# Patient Record
Sex: MALE | Race: BLACK OR AFRICAN AMERICAN
[De-identification: names, ages, dates, MRNs, and addresses within clinical notes are randomized per-mention and may not be internally consistent; named-entity substitution may affect disease eponyms.]

---

## 2017-05-09 ENCOUNTER — HOSPITAL ENCOUNTER (INPATIENT)
Dept: HOSPITAL 25 - ED | Age: 57
LOS: 1 days | Discharge: ANOTHER HOSPITAL | DRG: 281 | End: 2017-05-10
Attending: INTERNAL MEDICINE | Admitting: INTERNAL MEDICINE
Payer: COMMERCIAL

## 2017-05-09 DIAGNOSIS — I25.110: ICD-10-CM

## 2017-05-09 DIAGNOSIS — I16.0: ICD-10-CM

## 2017-05-09 DIAGNOSIS — E11.9: ICD-10-CM

## 2017-05-09 DIAGNOSIS — I10: ICD-10-CM

## 2017-05-09 DIAGNOSIS — Z82.49: ICD-10-CM

## 2017-05-09 DIAGNOSIS — Z88.8: ICD-10-CM

## 2017-05-09 DIAGNOSIS — I25.10: ICD-10-CM

## 2017-05-09 DIAGNOSIS — G47.33: ICD-10-CM

## 2017-05-09 DIAGNOSIS — I21.4: Primary | ICD-10-CM

## 2017-05-09 DIAGNOSIS — Z87.891: ICD-10-CM

## 2017-05-09 DIAGNOSIS — E66.01: ICD-10-CM

## 2017-05-09 DIAGNOSIS — I77.1: ICD-10-CM

## 2017-05-09 DIAGNOSIS — Z83.3: ICD-10-CM

## 2017-05-09 DIAGNOSIS — I77.810: ICD-10-CM

## 2017-05-09 DIAGNOSIS — I08.3: ICD-10-CM

## 2017-05-09 LAB
ALBUMIN SERPL BCG-MCNC: 4.1 G/DL (ref 3.2–5.2)
ALP SERPL-CCNC: 95 U/L (ref 34–104)
ALT SERPL W P-5'-P-CCNC: 12 U/L (ref 7–52)
ANION GAP SERPL CALC-SCNC: 7 MMOL/L (ref 2–11)
AST SERPL-CCNC: 18 U/L (ref 13–39)
BUN SERPL-MCNC: 10 MG/DL (ref 6–24)
BUN/CREAT SERPL: 10.4 (ref 8–20)
CALCIUM SERPL-MCNC: 9.6 MG/DL (ref 8.6–10.3)
CHLORIDE SERPL-SCNC: 100 MMOL/L (ref 101–111)
CK SERPL-CCNC: 98 U/L (ref 10–223)
GLOBULIN SER CALC-MCNC: 4.2 G/DL (ref 2–4)
GLUCOSE SERPL-MCNC: 95 MG/DL (ref 70–100)
HCO3 SERPL-SCNC: 26 MMOL/L (ref 22–32)
HCT VFR BLD AUTO: 40 % (ref 42–52)
HCT VFR BLD AUTO: 42 % (ref 42–52)
HGB BLD-MCNC: 12.9 G/DL (ref 14–18)
HGB BLD-MCNC: 13.4 G/DL (ref 14–18)
LIPASE SERPL-CCNC: 13 U/L (ref 11–82)
MAGNESIUM SERPL-MCNC: 2 MG/DL (ref 1.9–2.7)
MCH RBC QN AUTO: 26 PG (ref 27–31)
MCH RBC QN AUTO: 26 PG (ref 27–31)
MCHC RBC AUTO-ENTMCNC: 32 G/DL (ref 31–36)
MCHC RBC AUTO-ENTMCNC: 32 G/DL (ref 31–36)
MCV RBC AUTO: 82 FL (ref 80–94)
MCV RBC AUTO: 83 FL (ref 80–94)
POTASSIUM SERPL-SCNC: 4.3 MMOL/L (ref 3.5–5)
PROT SERPL-MCNC: 8.3 G/DL (ref 6.4–8.9)
RBC # BLD AUTO: 4.94 10^6/UL (ref 4–5.4)
RBC # BLD AUTO: 5.1 10^6/UL (ref 4–5.4)
SODIUM SERPL-SCNC: 133 MMOL/L (ref 133–145)
TROPONIN I SERPL-MCNC: 0.47 NG/ML (ref ?–0.04)
TSH SERPL-ACNC: 1.26 MCIU/ML (ref 0.34–5.6)
WBC # BLD AUTO: 6.1 10^3/UL (ref 3.5–10.8)
WBC # BLD AUTO: 8.2 10^3/UL (ref 3.5–10.8)

## 2017-05-09 PROCEDURE — 94760 N-INVAS EAR/PLS OXIMETRY 1: CPT

## 2017-05-09 PROCEDURE — 36415 COLL VENOUS BLD VENIPUNCTURE: CPT

## 2017-05-09 PROCEDURE — 84484 ASSAY OF TROPONIN QUANT: CPT

## 2017-05-09 PROCEDURE — 93306 TTE W/DOPPLER COMPLETE: CPT

## 2017-05-09 PROCEDURE — 83735 ASSAY OF MAGNESIUM: CPT

## 2017-05-09 PROCEDURE — C1760 CLOSURE DEV, VASC: HCPCS

## 2017-05-09 PROCEDURE — 80061 LIPID PANEL: CPT

## 2017-05-09 PROCEDURE — 83036 HEMOGLOBIN GLYCOSYLATED A1C: CPT

## 2017-05-09 PROCEDURE — 83880 ASSAY OF NATRIURETIC PEPTIDE: CPT

## 2017-05-09 PROCEDURE — 82550 ASSAY OF CK (CPK): CPT

## 2017-05-09 PROCEDURE — 87641 MR-STAPH DNA AMP PROBE: CPT

## 2017-05-09 PROCEDURE — 85730 THROMBOPLASTIN TIME PARTIAL: CPT

## 2017-05-09 PROCEDURE — 85379 FIBRIN DEGRADATION QUANT: CPT

## 2017-05-09 PROCEDURE — 80048 BASIC METABOLIC PNL TOTAL CA: CPT

## 2017-05-09 PROCEDURE — 83605 ASSAY OF LACTIC ACID: CPT

## 2017-05-09 PROCEDURE — 93005 ELECTROCARDIOGRAM TRACING: CPT

## 2017-05-09 PROCEDURE — 93458 L HRT ARTERY/VENTRICLE ANGIO: CPT

## 2017-05-09 PROCEDURE — 85610 PROTHROMBIN TIME: CPT

## 2017-05-09 PROCEDURE — 86140 C-REACTIVE PROTEIN: CPT

## 2017-05-09 PROCEDURE — 71010: CPT

## 2017-05-09 PROCEDURE — 83690 ASSAY OF LIPASE: CPT

## 2017-05-09 PROCEDURE — 82553 CREATINE MB FRACTION: CPT

## 2017-05-09 PROCEDURE — 84443 ASSAY THYROID STIM HORMONE: CPT

## 2017-05-09 PROCEDURE — 80053 COMPREHEN METABOLIC PANEL: CPT

## 2017-05-09 PROCEDURE — 85025 COMPLETE CBC W/AUTO DIFF WBC: CPT

## 2017-05-09 PROCEDURE — 76937 US GUIDE VASCULAR ACCESS: CPT

## 2017-05-09 RX ADMIN — CAPTOPRIL SCH MG: 12.5 TABLET ORAL at 20:16

## 2017-05-09 RX ADMIN — NITROGLYCERIN SCH INCH: 20 OINTMENT TOPICAL at 20:18

## 2017-05-09 RX ADMIN — ACETAMINOPHEN PRN MG: 325 TABLET ORAL at 20:16

## 2017-05-09 RX ADMIN — ATORVASTATIN CALCIUM ONE MG: 80 TABLET, FILM COATED ORAL at 12:44

## 2017-05-09 RX ADMIN — HEPARIN SODIUM AND DEXTROSE SCH MLS/HR: 5000; 5 INJECTION INTRAVENOUS at 12:32

## 2017-05-09 RX ADMIN — METOPROLOL TARTRATE SCH MG: 25 TABLET, FILM COATED ORAL at 20:16

## 2017-05-09 RX ADMIN — ATORVASTATIN CALCIUM ONE: 80 TABLET, FILM COATED ORAL at 12:44

## 2017-05-09 RX ADMIN — NITROGLYCERIN SCH INCH: 20 OINTMENT TOPICAL at 11:56

## 2017-05-09 NOTE — ED
Shilpa MADDOX Auryana, scribed for Rosendo Nolen MD on 05/09/17 at 1132 .





HPI Chest Pain





- HPI Summary


HPI Summary: 


 57 year old male presents with chest pain starting this morning while walking 

for 3-5 minutes. He reports that the pain was located in the mid sternum and 

radiated into the left arm with tightness. He reports that it initially started 

as a burning pain. With exertion, the pain is an 8/10, and is resolved with 

rest. He reports that he still has tightness in the shoulders and arms (mostly L

) but denies any chest pain now. He denies any edema or calf pain. He reports 

that he has had one similar episode with activity about 6 weeks ago - states 

pain radiated to the back , shoulders, and bilateral arms- subsided with rest. 

No prior stress tests.  PMHx is significant for HTN (no medication). No PMHx of 

abdominal surgeries.








- History of Current Complaint


Chief Complaint: EDChestPainROMI


Time Seen by Provider: 05/09/17 11:25


Hx Obtained From: Patient


Onset/Duration: Started Hours Ago - this morning while walking, Resolved - 

resolved with rest


Timing: Lasting Minutes - 3-5 minutes


Initial Severity: Mild


Current Severity: Mild


Pain Intensity: 2 - reports no CP on ED physician visit 


Pain Scale Used: 0-10 Numeric


Chest Pain Location: Diffuse, Mid Sternal


Chest Pain Radiates: Yes


Chest Pain Radiates To:: Shoulder - tightness, Arm - tightness


Character: Pressure/Squeezing, Tightness


Aggravating Factor(s): Exertion - brings on pain


Alleviating Factor(s): Rest


Associated Signs and Symptoms: Positive: Chest Pain - denies any now, Other: - 

tightness in arms and shoulder


Related History: Similar Episode/Dx as: - YES - SEE HPI





- Allergy/Home Medications


Allergies/Adverse Reactions: 


 Allergies











Allergy/AdvReac Type Severity Reaction Status Date / Time


 


Atenolol [From Tenormin] Allergy  Bleeding Verified 05/09/17 11:15














PMH/Surg Hx/FS Hx/Imm Hx


Cardiovascular History: Reports: Hx Hypertension - untreated


GI History: Reports: Hx Gastrointestinal Bleed


Infectious Disease History: No


Infectious Disease History: 


   Denies: Traveled Outside the [place] in Last 30 Days





- Family History


Known Family History: Positive: Hypertension, Diabetes





- Social History


Occupation: Employed Full-time


Lives: With Family - SIGNIFICANT OTHER


Alcohol Use: history of past ETOH abuse per history & physical in 2006


Hx Substance Use: No


Substance Use Type: Reports: None


Hx Tobacco Use: No


Smoking Status (MU): Never Smoked Tobacco





Review of Systems


Constitutional: Negative


Negative: Fever


Eyes: Negative


ENT: Negative


Cardiovascular: Negative


Negative: Chest Pain - none now


Respiratory: Negative


Gastrointestinal: Negative


Genitourinary: Negative


Positive: Other - tightness in the shoulers and bilateral arms (L>R).  Negative

: Edema


Skin: Negative


Neurological: Negative


Psychological: Normal


All Other Systems Reviewed And Are Negative: Yes





Physical Exam


Triage Information Reviewed: Yes


Vital Signs On Initial Exam: 


 Initial Vitals











Temp Pulse Resp BP Pulse Ox


 


 97.8 F   114   20   180/98   100 


 


 05/09/17 10:30  05/09/17 10:30  05/09/17 10:30  05/09/17 10:30  05/09/17 10:30











Vital Signs Reviewed: Yes


Appearance: Positive: Well-Appearing, No Pain Distress


Skin: Positive: Warm, Skin Color Reflects Adequate Perfusion, Dry


Head/Face: Positive: Normal Head/Face Inspection


Eyes: Positive: EOMI, FLACA


ENT: Positive: Normal ENT inspection


Neck: Positive: Supple, Nontender


Respiratory/Lung Sounds: Positive: Clear to Auscultation, Breath Sounds Present


Cardiovascular: Positive: RRR.  Negative: Leg Edema Left, Leg Edema Right


Abdomen Description: Positive: Nontender, Soft


Bowel Sounds: Positive: Present


Musculoskeletal: Positive: Normal, Strength/ROM Intact.  Negative: Edema Left, 

Edema Right


Neurological: Positive: Normal, Sensory/Motor Intact, Alert, Oriented to Person 

Place, Time


Psychiatric: Positive: Normal, Affect/Mood Appropriate





Diagnostics





- Vital Signs


 Vital Signs











  Temp Pulse Resp BP Pulse Ox


 


 05/09/17 10:54   94  10   98


 


 05/09/17 10:51  98.4 F  103  19  227/143  100


 


 05/09/17 10:30  97.8 F  114  20  180/98  100














- Laboratory


Lab Results: 


 Lab Results











  05/09/17 05/09/17 05/09/17 Range/Units





  11:05 11:05 11:05 


 


WBC  6.1    (3.5-10.8)  10^3/ul


 


RBC  5.10    (4.0-5.4)  10^6/ul


 


Hgb  13.4 L    (14.0-18.0)  g/dl


 


Hct  42    (42-52)  %


 


MCV  83    (80-94)  fL


 


MCH  26 L    (27-31)  pg


 


MCHC  32    (31-36)  g/dl


 


RDW  14    (10.5-15)  %


 


Plt Count  265    (150-450)  10^3/ul


 


MPV  8    (7.4-10.4)  um3


 


Neut % (Auto)  50.3    (38-83)  %


 


Lymph % (Auto)  38.6    (25-47)  %


 


Mono % (Auto)  9.4 H    (1-9)  %


 


Eos % (Auto)  0.6    (0-6)  %


 


Baso % (Auto)  1.1    (0-2)  %


 


Absolute Neuts (auto)  3.0    (1.5-7.7)  10^3/ul


 


Absolute Lymphs (auto)  2.3    (1.0-4.8)  10^3/ul


 


Absolute Monos (auto)  0.6    (0-0.8)  10^3/ul


 


Absolute Eos (auto)  0    (0-0.6)  10^3/ul


 


Absolute Basos (auto)  0.1    (0-0.2)  10^3/ul


 


Absolute Nucleated RBC  0.01    10^3/ul


 


Nucleated RBC %  0.2    


 


INR (Anticoag Therapy)   1.02   (0.89-1.11)  


 


APTT   30.1   (26.0-36.3)  seconds


 


D-Dimer, Quantitative   < 200   (Less Than 230)  ng/mL


 


Sodium    133  (133-145)  mmol/L


 


Potassium    4.3  (3.5-5.0)  mmol/L


 


Chloride    100 L  (101-111)  mmol/L


 


Carbon Dioxide    26  (22-32)  mmol/L


 


Anion Gap    7  (2-11)  mmol/L


 


BUN    10  (6-24)  mg/dL


 


Creatinine    0.96  (0.67-1.17)  mg/dL


 


Est GFR ( Amer)    103.1  (>60)  


 


Est GFR (Non-Af Amer)    80.2  (>60)  


 


BUN/Creatinine Ratio    10.4  (8-20)  


 


Glucose    95  ()  mg/dL


 


Lactic Acid     (0.5-2.0)  mmol/L


 


Calcium    9.6  (8.6-10.3)  mg/dL


 


Magnesium    2.0  (1.9-2.7)  mg/dL


 


Total Bilirubin    0.50  (0.2-1.0)  mg/dL


 


AST    18  (13-39)  U/L


 


ALT    12  (7-52)  U/L


 


Alkaline Phosphatase    95  ()  U/L


 


Total Creatine Kinase    98  ()  U/L


 


CK-MB (CK-2)    3.1  (0.6-6.3)  ng/mL


 


Troponin I    0.47 H*  (<0.04)  ng/mL


 


C-Reactive Protein    20.36 H  (< 5.00)  mg/L


 


B-Natriuretic Peptide    ( - 100) pg/mL


 


Total Protein    8.3  (6.4-8.9)  g/dL


 


Albumin    4.1  (3.2-5.2)  g/dL


 


Globulin    4.2 H  (2-4)  g/dL


 


Albumin/Globulin Ratio    1.0  (1-3)  


 


Lipase    13  (11.0-82.0)  U/L


 


TSH    1.26  (0.34-5.60)  mcIU/mL














  05/09/17 05/09/17 Range/Units





  11:05 11:05 


 


WBC    (3.5-10.8)  10^3/ul


 


RBC    (4.0-5.4)  10^6/ul


 


Hgb    (14.0-18.0)  g/dl


 


Hct    (42-52)  %


 


MCV    (80-94)  fL


 


MCH    (27-31)  pg


 


MCHC    (31-36)  g/dl


 


RDW    (10.5-15)  %


 


Plt Count    (150-450)  10^3/ul


 


MPV    (7.4-10.4)  um3


 


Neut % (Auto)    (38-83)  %


 


Lymph % (Auto)    (25-47)  %


 


Mono % (Auto)    (1-9)  %


 


Eos % (Auto)    (0-6)  %


 


Baso % (Auto)    (0-2)  %


 


Absolute Neuts (auto)    (1.5-7.7)  10^3/ul


 


Absolute Lymphs (auto)    (1.0-4.8)  10^3/ul


 


Absolute Monos (auto)    (0-0.8)  10^3/ul


 


Absolute Eos (auto)    (0-0.6)  10^3/ul


 


Absolute Basos (auto)    (0-0.2)  10^3/ul


 


Absolute Nucleated RBC    10^3/ul


 


Nucleated RBC %    


 


INR (Anticoag Therapy)    (0.89-1.11)  


 


APTT    (26.0-36.3)  seconds


 


D-Dimer, Quantitative    (Less Than 230)  ng/mL


 


Sodium    (133-145)  mmol/L


 


Potassium    (3.5-5.0)  mmol/L


 


Chloride    (101-111)  mmol/L


 


Carbon Dioxide    (22-32)  mmol/L


 


Anion Gap    (2-11)  mmol/L


 


BUN    (6-24)  mg/dL


 


Creatinine    (0.67-1.17)  mg/dL


 


Est GFR ( Amer)    (>60)  


 


Est GFR (Non-Af Amer)    (>60)  


 


BUN/Creatinine Ratio    (8-20)  


 


Glucose    ()  mg/dL


 


Lactic Acid  1.6   (0.5-2.0)  mmol/L


 


Calcium    (8.6-10.3)  mg/dL


 


Magnesium    (1.9-2.7)  mg/dL


 


Total Bilirubin    (0.2-1.0)  mg/dL


 


AST    (13-39)  U/L


 


ALT    (7-52)  U/L


 


Alkaline Phosphatase    ()  U/L


 


Total Creatine Kinase    ()  U/L


 


CK-MB (CK-2)    (0.6-6.3)  ng/mL


 


Troponin I    (<0.04)  ng/mL


 


C-Reactive Protein    (< 5.00)  mg/L


 


B-Natriuretic Peptide   54 ( - 100) pg/mL


 


Total Protein    (6.4-8.9)  g/dL


 


Albumin    (3.2-5.2)  g/dL


 


Globulin    (2-4)  g/dL


 


Albumin/Globulin Ratio    (1-3)  


 


Lipase    (11.0-82.0)  U/L


 


TSH    (0.34-5.60)  mcIU/mL











Result Diagrams: 


 05/09/17 11:05





 05/09/17 11:05


Lab Statement: Any lab studies that have been ordered have been reviewed, and 

results considered in the medical decision making process.





- Radiology


  ** CXR


Xray Interpretation: No Acute Changes


Radiology Interpretation Completed By: Radiologist





- EKG


  ** 10:44


EKG Interpretation: NSR@96bpm,flipped T (V4,V5,V6);flat T(inferior lead);Q 

waves (III & aVf)





  ** 10:45


EKG Comparison: No Significant Change - from 10:44 EKG





Chest Pain Course/Dx





- Course


Course Of Treatment: CRITICAL CARE TIME LESS THAN 30 MINUTES


Assessment/Plan: DISCUSSED RESULTS WITH PATIENT/WIFE/HOSPITALIST. ADMIT 

HOSPITALIST STABLE. CARDIOLOGY SAW PATIENT IN ED.





- Diagnoses


Provider Diagnoses: 


 Chest pain, Elevated troponin, Hypertension








- Provider Notifications


Discussed Care Of Patient With: Dr. Shaw


Time Discussed With Above Provider: 11:32 - agrees to admit





Discharge





- Discharge Plan


Condition: Stable


Disposition: ADMITTED TO [facility] documentation as recorded by the Shilpa logan Auryana accurately 

reflects the service I personally performed and the decisions made by me, 

Rosendo Nolen MD.

## 2017-05-09 NOTE — CONS
CARDIOLOGY CONSULTATION:

 

DATE OF CONSULT:  05/09/17

 

INDICATION FOR CONSULTATION:  Chest pain, acute coronary syndrome.

 

HISTORY OF PRESENT ILLNESS:  The patient is a 57-year-old gentleman who did not 
seek medical care in the last 10 years who was admitted to the emergency room 
with chest pain.  The patient states that he had a significant episode of chest 
pain 6 weeks ago.  He described it as a crushing chest pain, radiating to both 
arms that lasted for approximately 3 hours and then resolved on its own.  Since 
then, the patient has been taking an aspirin a day and some other home 
remedies.  Since for the past week or so, he has been getting chest pain with 
exertion.  However, yesterday, he had chest pain with minimal exertion just 
from walking around his apartment.  The patient came into the emergency room 
this morning at the behest of his wife for his chest pain.  On arrival to the 
emergency room, the patient was markedly hypertensive with a blood pressure of 
280/110.  He was given sublingual nitroglycerin and IV Lopressor to lower his 
blood pressure.  The patient's initial troponin level was 0.47.  His initial 
EKG showed normal sinus rhythm with T-wave inversions in V4 through V6.

 

PAST MEDICAL HISTORY:  Unremarkable.  He did have a lower GI bleed back in 2006 
and had a colonoscopy at that time and showed diverticular disease.

 

OUTPATIENT MEDICATIONS:  None.

 

ALLERGIES:  None.

 

SOCIAL HISTORY:  He works as a general maintenance.  He denies tobacco or 
alcohol use.

 

REVIEW OF SYSTEMS:  Negative for fevers and chills.  Negative for changes in 
bowel or bladder habits.  Negative for changes in weight.  Again, he has not 
sought medical care in over 10 years.

 

PHYSICAL EXAMINATION:  Height is 6 feet, weight 280 pounds.  Blood pressure 166/
108, heart rate is 96, respiratory rate is 21, oxygen saturation 95% on room air
, temperature 97.4.  Sclerae anicteric.  Oropharynx is pink without erythema. 
Carotids are 2+ without bruits.  JVD is normal.  Thyroid is normal.  Cardiac 
Exam: Distant heart sounds.  S1, S2 without any obvious murmurs, rubs or 
gallops.  PMI is difficult to assess.  Lungs:  Clear to auscultation.  There is 
no dullness to percussion.  Abdomen:  Obese, soft, nontender, nondistended with 
normoactive bowel sounds.  Extremities:  Showed no edema.  He has 2+ pulses in 
dorsalis pedis and popliteal.  The patient is awake, alert and oriented.  He 
moves all 4 extremities equally.

 

DIAGNOSTIC STUDIES/LAB DATA:  CBC within normal limits.  Chemistries within 
normal limits.  BUN 10, creatinine 0.96, troponin level 0.47.  BNP is 54, TSH 
1.26.

 

IMPRESSION:  This is a 57-year-old gentleman who does not seek medical care, 
who has been having typical anginal type symptoms.  The patient had a 
significant episode of chest pain 6 weeks ago that lasted 3 or 4 hours.  He did 
not seek medical care at that time.  The patient has been having exertional 
chest pain for the past week.  The patient's troponin level is elevated and his 
EKG shows T-wave inversions.

 

The patient will be admitted to the intensive care unit.  The patient will be 
started on IV heparin, beta-blocker, statin therapy.  The patient is 
recommended to undergo cardiac catheterization.  Risks and benefits of this 
were described in detail.  The patient is willing to proceed.  Further 
recommendations pending results of his echocardiogram and cardiac 
catheterization.

 

 [id number]/[facility] #: [id number]

MTDD

## 2017-05-09 NOTE — RAD
HISTORY: Chest pain



COMPARISONS: None



VIEWS:1: Single frontal portable view of the chest at 10:52 AM



FINDINGS:

LINES AND TUBES: None.

CARDIOMEDIASTINAL SILHOUETTE: The cardiomediastinal silhouette is normal for portable

technique.

PLEURA: The costophrenic angles are sharp. No pleural abnormalities are noted.

LUNG PARENCHYMA: The lungs are clear.

ABDOMEN: The upper abdomen is clear. There is no subphrenic gas.

BONES AND SOFT TISSUES: No bone or soft tissue abnormalities are noted.



IMPRESSION: NO ACTIVE CARDIOPULMONARY DISEASE.

## 2017-05-09 NOTE — HP
*** ATTENDING PHYSICIAN'S ADDENDUM NOW INCLUDED ON THIS REPORT ***



HISTORY AND PHYSICAL:

 

DATE OF ADMISSION:  05/09/17

 

PRIMARY CARE PROVIDER:  None.

 

ATTENDING PHYSICIAN WHILE IN THE HOSPITAL:  Dr. Emily Shaw * (report 
dictated by Jeff Ken NP).

 

CONSULTING CARDIOLOGIST:  Dr. Sims.

 

CHIEF COMPLAINT:  Chest pain.

 

HISTORY OF PRESENTING ILLNESS:  Mr. Souza is a 57-year-old male patient who 
has a history of hypertension an JENNY.  He has a history of alcohol abuse in the 
past many years ago.  He comes into the ER today stating that 6 weeks ago, he 
noticed he had an episode of chest tightness associated with nausea, sweating, 
pain radiating into the jaw, down the arm and the pressure in the chest, it was 
constant for 2 to 3 days, it went away.  He tried taking holistic medications 
and approach to the discomfort.  He thought may be it was something related to 
his heart. Unfortunately though over the last week, he has had progressive 
worsening chest pain, particularly with exertion.  He works as a  
over at [place] and anytime he is doing any activity at work, he notices he 
gets pain with exertion. He has to sit down and that goes away and the pain is 
in the center of his chest with association of diaphoresis.  It does go into 
his jaw at times and sometimes down his arm. It has been getting progressively 
worse.  He denied having any fever, no chills.  He denies having any abdominal 
pain.  There has been no nausea, or any vomiting and he denies having any 
recent fevers or chills and no travel or leg of calf pain.  He came into the ER 
today, was evaluated. Ultimately, his troponin was elevated at 0.47.  He had an 
EKG changes and we were asked to evaluate for admission.  Because of these 
changes, we were asked to evaluate for admission.

 

PAST MEDICAL HISTORY:  Significant for:

1.  Hypertension.

2.  Questionable JENNY.

3.  Alcohol abuse in the past according to the old records.

 

PAST SURGICAL HISTORY:  Denied.

 

HOME MEDICATIONS:  Denied.

 

ALLERGIES TO MEDICATIONS:  Include ATENOLOL.

 

FAMILY HISTORY:  Mother had hypertension, father is diabetic.

 

SOCIAL HISTORY:  He is a former smoker about a pack a day for 12 years.  He 
currently does not drink alcohol anymore.  He works at [employer].  Surrogate 
decision maker is his Jesusita el.

 

REVIEW OF SYSTEMS:  There is no documented fever here.  He denied having any 
significant weight change. There was no double vision.  He denies having any 
ear discharge.  There was no rhinorrhea.  There was no sore throat, no thyroid 
enlargement.  Denies having any chest pain.  There was no orthopnea.  There was 
no nocturnal dyspnea.  He denies having any abdominal pain.  There was no nausea
, no vomiting, no dysuria, no frequency.  There was chest discomfort from my 
HPI. Review of 14 systems completed, all others negative.

 

                               PHYSICAL EXAMINATION

 

GENERAL:  At this time, Mr. Souza is a 57-year-old male patient.  He is a 
morbidly obese.  He is sitting in the ER stretcher.  He does not appear to be 
in any acute distress.

 

VITAL SIGNS:  Reveals blood pressure when he initially came in was 227/143,  it 
is now 186/118, heart rate 90, respirations 18, O2 sat 100%, temperature 98.4.

 

HEENT:  Head: Atraumatic and normocephalic. Eyes:  EOMs are intact.  Sclerae 
was anicteric not pale.  Throat:  Oral mucosa appears to be moist.  No 
oropharyngeal erythema.

 

NECK:  Supple.

 

LUNGS: Clear to auscultation bilaterally.  No wheezes, rales or rhonchi.

 

HEART:  Heart sounds S1, S2.  Regular rate and rhythm.  No murmurs, rubs or 
gallops.

 

ABDOMEN:  Soft, it was flat, nontender. Bowel sounds present.

 

EXTREMITIES:  Pulses were 2+ throughout.  He is able to move all 4 extremities 
with 5/5 strength.

 

NEUROLOGIC:  The patient is awake.  He is alert.  He is oriented x3.  His 
tongue is midline.   are equal. He had no gross focal deficits.

 

SKIN:  Intact.

 

 DIAGNOSTIC STUDIES/LAB DATA:  Labs today revealed WBC of 6.1, RBC of 5.10, 
hemoglobin of 13.4, hematocrit 42, platelet count 265.  The INR was 1.02.  PTT 
at 30.1. D-dimer is less than 200.

 

Sodium was 133, potassium 4.3, chloride 100, bicarb of 26, BUN 10, creatinine 
0.96, glucose 95, lactate 1.6, calcium 9.6, mag 2.0, total bili 0.5, AST 18, 
ALT 12, alk phos 95.  CK 3.1, troponin was 0.47.  CRP at 20 and TSH at 1.26.  
Lipase was normal.

 

He had an EKG obtained today, which revealed a sinus rhythm at the rate of 90, T
- wave inversions in V4, V5 and V6.  No ST elevations.  It is reviewed in the 
previous EKG, I do not have previous EKG for comparison and T-wave inversions 
are presumably new.

 

He had a chest x-ray obtained today, which revealed no active cardiopulmonary 
disease.  Old medical records were reviewed.

 

ASSESSMENT AND PLAN:  Mr. Souza is a 57-year-old male patient coming into the 
ER today with complaints of chest pain with exertion, now found to have EKG 
changes and elevated troponin.  He will be admitted under inpatient status in 
ICU for:

 

1. Non-ST elevation myocardial infarction.  At this point again the patient's 
story is very concerning for acute coronary syndrome.  I did touch base with 
Dr. Sims, who will be evaluating the patient.  We will make him n.p.o.  His 
blood pressures need to be better controlled.  We did just start nitro paste 
and we started nitro sublingual.  If this does not control him, I probably 
would put him on a nitro drip.  I am going to go obviously give him 5 of IV 
Lopressor.  There is an allergy to ATENOLOL but again there was no hives or any 
anaphylactic type reactions, so I am going to give it and monitor him.  We will 
give him Lopressor 5 IV every 6 hours and hopefully this will help get the 
pressure down.  If it does not, then I probably would convert him over to nitro 
drip and perhaps labetalol drip or an esmolol drip and we will continue to 
aggressively treat that blood pressure in the setting of acute coronary 
syndrome.  He will be placed on heparin.  He will be placed on aspirin and we 
will give him high dose of statin therapy and we will continue to treat this 
aggressively and again Cardiology will be evaluating and I did put in an echo, 
we will cycle the troponin.  we will get serial EKGs. We will check lipid panel 
and A1c in the morning.

2.  History of obstructive sleep apnea.  He needs on the outpatient setting to 
be setup with CPAP.

3.  Hypertension. Again controlling this would be a priority.  I would like to 
get him down to 160 to 170 systolic or actually between the 140 to 160 range.  
If I need to I will start drips.

4.  Deep vein thrombosis prophylaxis.  He will be placed on heparin subcu.

5.  Fluids, nutrition and electrolytes.  He is n.p.o.

6. Code status.  Full code.

 

TIME SPENT:  Time spent on this admission was approximately 70 minutes, greater 
than half the time was spent face-to-face with the patient, obtaining my 
history of physical; the other half time was spent going over the plan of care 
with the patient and implementing plan of care.

 

I did discuss the plan of care with my attending, Dr. Shaw; she is in agreement.

 

 ____________________________________ JEFF KEN NP

 

 

ADDENDUM:  



Mr. Souza is a 57-year-old male who presented to the hospital with complaints 
of 6 weeks of exertional chest pain, his blood pressures were over 200. The 
patient is going to be treated in the intensive care unit for hypertensive 
emergency.  He also has elevated troponin and most likely exertional angina.  
For further details of the patient's presentation, please see the history and 
physical dictated by Jeff Ken on 05/09/17 with which I agree.

 

 ____________________________________ EMILY SHAW MD





CC:  Dr. Sims*



 

[id number]/[facility] #: [id number]

[id number] #: [id number]

[email or web address]

## 2017-05-09 NOTE — ECHO
Patient:      JUNIOR MCDANIELS 

[facility] Rec#:     [id number]            :          1960          

Date:         2017            Age:          57y                 

Account#:     [id number]          Height:       182.9 cm / 72.0 in

Accession#:   [id number]           Weight:       127 kg / 279.9 lbs

Sex:          M                     BSA:          2.5

Room#:        ICU 3                 

Admit Date#:  2017          

Type:         Inpatient

 

Referring:    Jeff Ken NP

Reading:      Marquis Sims MD

Sonographer:  Natalia Fields RN RDCS

______________________________________________________________________

 

Transthoracic Echocardiogram

 

Indication:

NSTEMI

BP:           151/95

HR:           87

Rhythm:       NSR

 

Findings     

History:

HTN, obesity, former smoker 

 

Technical Comments:

The study is technically limited due to patient body habitus.  The study

is technically limited due to the patient's smoking history.  Completed

at 1540. 

 

Left Ventricle:

The left ventricular chamber size is mildly dilated. There is increased

basal septal hypertrophy noted without evidence of an increased gradient

across the left ventricular outflow tract.  There are multiple regional

wall motion abnormalities.  There is moderately decreased left

ventricular systolic function. The estimated ejection fraction is

30-35%.  There is an E to A reversal in the mitral valve flow pattern

suggestive of diastolic dysfunction. The  mid anteroseptal, mid

anterior, apical anterior, and  apical inferior wall segments are

hypokinetic (score 2). The  apical septal wall segment is akinetic

(score 3). Overall wallmotion score index is  2.20 

 

Left Atrium:

The left atrial chamber size is normal. 

 

Right Ventricle:

The right ventricular cavity size is normal. The right ventricular

global systolic function is normal. 

 

Right Atrium:

The right atrial cavity size is normal. 

 

Aortic Valve:

The aortic valve is trileaflet. The aortic valve leaflets are mildly

thickened. There is mild aortic regurgitation.  There is no evidence of

aortic stenosis. 

 

Mitral Valve:

The mitral valve leaflets are mildly thickened. There is trace to mild

mitral regurgitation. There is no evidence of mitral stenosis. 

 

Tricuspid Valve:

The tricuspid valve leaflets are normal.  There is trace to mild

tricuspid regurgitation. There is evidence of mild pulmonary

hypertension. There is no tricuspid stenosis. 

 

Pulmonic Valve:

The pulmonic valve appears normal. There is mild pulmonic regurgitation.

 There is no pulmonic stenosis.  

 

Pericardium:

There is no significant pericardial effusion. A pericardial fat pad is

visualized. 

 

Aorta:

There is moderate dilatation of the ascending aorta.4.4 cm There is mild

dilatation of the aortic arch. There is mild dilatation of the aortic

root. 

 

Pulmonary Artery:

The main pulmonary artery appears normal. 

 

Venous:

The inferior vena cava appears normal in size. There is a greater than

50% respiratory change in the inferior vena cava dimension. 

 

Summary:

There was not any prior study for comparison. 

 

Conclusions

There is moderately decreased left ventricular systolic function.

The estimated ejection fraction is 30-35%. 

The  mid anteroseptal, mid anterior, apical anterior, and  apical

inferior wall segments are hypokinetic (score 2).

The  apical septal wall segment is akinetic (score 3).

The right ventricular global systolic function is normal.

The aortic valve leaflets are mildly thickened.

There is mild aortic regurgitation. 

There is no evidence of aortic stenosis.

There is trace to mild mitral regurgitation.

There is trace to mild tricuspid regurgitation.

There is no significant pericardial effusion.

There is moderate dilatation of the ascending aorta.4.4 cm

There was not any prior study for comparison.

 

Measurements     

Name                    Value         Normal Range            

RVIDd (AP) 2D           2.6 cm        (0.9 - 2.6)             

RVDdMajor (2D)          4.1 cm        (2.2 - 4.4)             

RAd ISD 4CH             4.6 cm        (3.4 - 4.9)             

RA (A4C)W               4 cm          (2.9 - 4.6)             

IVSd (2D)               1 cm          (0.6 - 1)               

LVPWd (2D)              1 cm          (0.6 - 1)               

LVIDd (2D)              5.9 cm        (3.6 - 5.4)             

LVIDs (2D)              4.5 cm        -                        

LV FS (2D)              23 %          (25 - 45)               

Aortic Annulus          2.9 cm        (1.4 - 2.6)             

Ao root diameter (2D)   3.4 cm        (2.1 - 3.5)             

Ascending Ao            4.4 cm        (2.1 - 3.4)             

Aortic arch             3.6 cm        (1.8 - 3.4)             

LA dimension (AP) 2D    3.7 cm        (2.3 - 3.8)             

LAd ISD 4CH             4.9 cm        (2.9 - 5.3)             

LA ISD 4CH W            4.2 cm        (2.5 - 4.5)             

 

Name                    Value         Normal Range            

LA ESV SP 4CH (A/L)     60 ml         -                        

LA ESV SP 2CH (A/L)     71 ml         -                        

LA ESV BP (A/L)         65 ml         -                        

LA ESV BP (A/L) index   26.5 ml/m2    -                        

LA ESV SP 4CH (MOD)     56 ml         -                        

LA ESV SP 2CH (MOD)     65 ml         -                        

 

Name                    Value         Normal Range            

MV E-wave Vmax          0.75 m/sec    -                        

MV deceleration time    189 msec      -                        

MV A-wave Vmax          0.9 m/sec     -                        

MV E:A ratio            0.8 ratio     -                        

LV septal e' Vmax       0.09 m/sec    -                        

LV lateral e' Vmax      0.07 m/sec    -                        

LV E:e' septal ratio    8.3 ratio     -                        

LV E:e' lateral ratio   10.7 ratio    -                        

 

Name                    Value         Normal Range            

AV Vmax                 1.6 m/sec     -                        

AV VTI                  33 cm         -                        

AV peak gradient        9.6 mmHg      -                        

AV mean gradient        5.8 mmHg      -                        

LVOT Vmax               1.1 m/sec     -                        

LVOT VTI                21.7 cm       -                        

LVOT peak gradient      4.6 mmHg      -                        

LVOT mean gradient      2.9 mmHg      -                        

MADINA Vmax                0.52 m/sec    -                        

 

Name                    Value         Normal Range            

TR Vmax                 2.9 m/sec     -                        

TR peak gradient        34 mmHg       -                        

RAP                     3 mmHg        -                        

RVSP                    37 mmHg       -                        

IVC diameter            1.5 cm        -                        

 

Name                    Value         Normal Range            

PV Vmax                 0.91 m/sec    -                        

 

Wallmotion

 

BAS          Not Seen

BA           Not Seen

BAL          Not Seen

FIDENCIO          Not Seen

BI           Not Seen

BIS          Not Seen

MAS          Hypokinetic

MA           Hypokinetic

MAL          Not Seen

MIL          Not Seen

MI           Not Seen

MIS          Not Seen

AS           Akinetic

AA           Hypokinetic

AL           Not Seen

AI           Hypokinetic

APEX         Hypokinetic

 

Electronically signed by: Marquis Sims MD on 2017 16:21:05

## 2017-05-09 NOTE — HP
HISTORY AND PHYSICAL:*

 

DATE OF ADMISSION:

 

ADDENDUM:  Mr. Souza is a 57-year-old male who presented to the hospital with 
complaints of 6 weeks of exertional chest pain, his blood pressures were over 
200. The patient is going to be treated in the intensive care unit for 
hypertensive emergency.  He also has elevated troponin and most likely 
exertional angina.  For further details of the patient's presentation, please 
see the history and physical dictated by Jeff Ken on 05/09/17 with which I 
agree.

 

 

 

[id number]/[facility] #: [id number]

MTDD

## 2017-05-10 VITALS — SYSTOLIC BLOOD PRESSURE: 133 MMHG | DIASTOLIC BLOOD PRESSURE: 81 MMHG

## 2017-05-10 LAB
ANION GAP SERPL CALC-SCNC: 7 MMOL/L (ref 2–11)
BUN SERPL-MCNC: 14 MG/DL (ref 6–24)
BUN/CREAT SERPL: 14 (ref 8–20)
CALCIUM SERPL-MCNC: 9.2 MG/DL (ref 8.6–10.3)
CHLORIDE SERPL-SCNC: 100 MMOL/L (ref 101–111)
CHOLEST SERPL-MCNC: 197 MG/DL
GLUCOSE SERPL-MCNC: 110 MG/DL (ref 70–100)
HCO3 SERPL-SCNC: 26 MMOL/L (ref 22–32)
HCT VFR BLD AUTO: 39 % (ref 42–52)
HDLC SERPL-MCNC: 38.6 MG/DL
HGB BLD-MCNC: 12.4 G/DL (ref 14–18)
MCH RBC QN AUTO: 26 PG (ref 27–31)
MCHC RBC AUTO-ENTMCNC: 32 G/DL (ref 31–36)
MCV RBC AUTO: 83 FL (ref 80–94)
POTASSIUM SERPL-SCNC: 4 MMOL/L (ref 3.5–5)
RBC # BLD AUTO: 4.74 10^6/UL (ref 4–5.4)
SODIUM SERPL-SCNC: 133 MMOL/L (ref 133–145)
TRIGL SERPL-MCNC: 103 MG/DL
TROPONIN I SERPL-MCNC: 0.46 NG/ML (ref ?–0.04)
WBC # BLD AUTO: 7 10^3/UL (ref 3.5–10.8)

## 2017-05-10 PROCEDURE — B2111ZZ FLUOROSCOPY OF MULTIPLE CORONARY ARTERIES USING LOW OSMOLAR CONTRAST: ICD-10-PCS | Performed by: INTERNAL MEDICINE

## 2017-05-10 PROCEDURE — 4A023N7 MEASUREMENT OF CARDIAC SAMPLING AND PRESSURE, LEFT HEART, PERCUTANEOUS APPROACH: ICD-10-PCS | Performed by: INTERNAL MEDICINE

## 2017-05-10 PROCEDURE — B2151ZZ FLUOROSCOPY OF LEFT HEART USING LOW OSMOLAR CONTRAST: ICD-10-PCS | Performed by: INTERNAL MEDICINE

## 2017-05-10 RX ADMIN — METOPROLOL TARTRATE SCH MG: 25 TABLET, FILM COATED ORAL at 07:04

## 2017-05-10 RX ADMIN — HEPARIN SODIUM AND DEXTROSE SCH MLS/HR: 5000; 5 INJECTION INTRAVENOUS at 01:57

## 2017-05-10 RX ADMIN — METOPROLOL TARTRATE SCH MG: 25 TABLET, FILM COATED ORAL at 01:57

## 2017-05-10 RX ADMIN — CAPTOPRIL SCH MG: 12.5 TABLET ORAL at 13:13

## 2017-05-10 RX ADMIN — SODIUM CHLORIDE SCH MLS/HR: 900 IRRIGANT IRRIGATION at 16:02

## 2017-05-10 RX ADMIN — METOPROLOL TARTRATE SCH MG: 25 TABLET, FILM COATED ORAL at 13:13

## 2017-05-10 RX ADMIN — ACETAMINOPHEN PRN MG: 325 TABLET ORAL at 13:24

## 2017-05-10 RX ADMIN — SODIUM CHLORIDE SCH MLS/HR: 900 IRRIGANT IRRIGATION at 10:45

## 2017-05-10 RX ADMIN — NITROGLYCERIN SCH: 20 OINTMENT TOPICAL at 04:30

## 2017-05-10 RX ADMIN — CAPTOPRIL SCH MG: 12.5 TABLET ORAL at 07:46

## 2017-05-10 RX ADMIN — NITROGLYCERIN SCH INCH: 20 OINTMENT TOPICAL at 13:10

## 2017-05-10 NOTE — TRS
TRANSFER SUMMARY:

 

DATE OF ADMISSION:  05/09/17

 

DATE OF DISCHARGE:  05/10/17

 

DISPOSITION ON TRANSFER:  To [facility].

 

CONDITION ON TRANSFER:  Stable.

 

REASON FOR TRANSFER:  Access to high-risk cardiac catheterization and/or cardiothoracic surgery.

 

PRIMARY DIAGNOSIS:  Myocardial infarction with 3-vessel coronary artery disease.

 

SECONDARY DIAGNOSES:

1.  Hypertensive emergency.

2.  Questionable history of obstructive sleep apnea.

3.  History of alcohol abuse

4.  Diabetes, hemoglobin A1c of 6.5%.

 

PROCEDURES PERFORMED DURING HOSPITAL STAY:  Cardiac catheterization performed by Dr. Stephan Jeffrey, 05
/10/17.

 

OVERALL ASSESSMENT:  Uploaded on transfer to [facility] includes moderate left ventricular systolic d
ysfunction, EF of 35% to 40%, and significant triple-vessel disease involving high-grade lesions in 
the mid LAD as well as significant lesions in the proximal portion of a diagonal branch.  Borderline
 significant in the proximal portion of the first obtuse marginal branch with a totally occluded cir
cumflex with retrograde filling to a low-lying obtuse marginal branch.  The right coronary artery ha
s significant disease involving the low-lying acute marginal branch, which does supply a significant
 amount of the myocardia of mid inferior to distal inferior myocardium.

 

Transthoracic echocardiogram, conclusion:  Estimated EF 30% to 35%.  Mid to anteroseptal, mid anteri
or, apical anterior, and apical inferior wall segments are hypokinetic.  The apical wall segment is 
akinetic.  Right ventricular systolic function is normal.  Mild AR, no evidence of AS, trace to mild
 MR, trace to mild TR, no significant pericardial effusion.  There is moderate dilatation in the asc
ending aorta.

 

PERTINENT LABORATORY DATA:  Hemoglobin on transfer 12.4, hematocrit 39.  Troponin I peaked at 0.51, 
decreased to 0.46 on the day of discharge.  BUN 14, creatinine 1.0. Hemoglobin A1c 6.5.  Triglycerid
es 103, total cholesterol 197, , HDL 38.6.

 

HISTORY OF PRESENT ILLNESS AND HOSPITAL COURSE:  This is a 57-year-old man with past medical history
 as outlined above including hypertension, questionable history of JENNY, lower GI bleed in 2006 statu
s post colonoscopy suspecting diverticular disease, as well as history of alcohol abuse presenting t
o the emergency room after experiencing increasing substernal chest pain with exertion, radiating to
 the jaw and down to the arm.  In the emergency room, he was noted with elevated troponin as well as
 T-wave inversions on EKG.  He was admitted to the ICU, started on heparin drip.  Blood pressure on 
presentation was 227, for which he was started on nitroglycerin drip as well to control his blood pr
essure.  Cardiac catheterization indicated 3-vessel cardiac disease indicated above.  Procedure was 
terminated without intervention.  His heparin was discontinued.  During the course of the procedure,
 he was given full-dose Lovenox 140 mg at 1:30 prior to his transfer. His next Lovenox dose should b
e at 1:30 a.m. on 05/11/17 or resumption of heparin drip.  He was started on captopril as well as me
toprolol as well as 1 inch of 2% nitroglycerin paste.  With these interventions, nitroglycerin drip 
was titrated off prior to his transfer with blood pressure in systolics 130s and diastolics 86.

 

After intervention, the patient was averse to cardiothoracic surgery.  I discussed at length with marcella boyd and he would prefer a less invasive intervention.  We discussed that if a less invasive interv
ention was possible, of course that would be an option; however, it still seemed that he would not w
ant cardiothoracic surgery if no less invasive procedure was an option.  He was seen in conjunction 
with his fiancee who is his healthcare proxy.  After discussing the risks of averting surgery as wel
l as why surgery might be necessary, the patient seemed more amenable to pursuing surgery and receiv
ing transfer for evaluation.

 

Plan on receipt of transfer:

 

1.  Please evaluate blood pressure.  Resume nitroglycerin drip or other intervention as deemed thuy cerrato.

2.  Please resume heparin products, either Lovenox at 1:30 a.m. on 05/11/16 or heparin drip. 3.  Ple
ase have evaluated by Cardiology and Cardiothoracic Surgery for potential CABG.

 

Thank you for your assistance in the care of this patient.

 

Please do not hesitate to contact me at our hospital, [phone].

 

 [id number]/[facility] #: [id number]

## 2017-05-10 NOTE — CATH
DATE OF PROCEDURE:  05/10/2017.

 

INDICATION FOR PROCEDURE:  The patient presents with progressive unstable 
anginal symptoms following a prolonged three day history of chest discomfort 
six weeks ago with fluctuating ST segment changes in the anterior leads and an 
abnormal troponin and an echocardiogram demonstrating significant left 
ventricular systolic function reported by Dr. Marquis Sims who did the 
consultation on the patient suggesting an ejection fraction of 30 to 35 percent 
with wall motion abnormalities including the mid anterior septal, mid anterior 
apical, anterior apical inferior, significant hypokinesis and akinesis of the 
apical septal area.  There was mild aortic regurgitation, trace to mild mitral 
regurgitation, trace to mild tricuspid regurgitation and moderate dilatation of 
the ascending aorta at 4.4 cm.

 

PROCEDURE PERFORMED:  Coronary arteriography, left heart catheterization, left 
ventriculography.

 

DESCRIPTION OF PROCEDURE:  The patient was interviewed and examined in the 
Intensive Care Unit the night prior to the procedure where the risks and 
benefits were explained.  He understood them and wished to proceed.  He was 
brought to the cardiovascular laboratory where a formal timeout was performed.  
Of note, earlier the right radial artery was assess in the Intensive Care Unit 
under ultrasound to see if it was appropriate size for an attempt as a site for 
cardiac catheterization.  It was found to be acceptable size and as such the 
right radial artery area was prepped and draped in a sterile fashion as was 
both groin areas. Under ultrasound guidance, the right radial artery was 
visualized.  The area was anesthetized with 1 percent Lidocaine.  The right 
radial artery was cannulated and a 6 Moldovan Glidesheath was placed.  Following 
this, a 4.5 curved TIG catheter was attempted to be advanced to the ascending 
aorta.  Of note, there was marked tortuosity of the innominate artery into the 
aorta.  There was great difficulty directing the catheter into the ascending 
aorta and this necessitated using a pigtail catheter with a wire which was able 
to be directed into the ascending aorta.  Following there were multiple 
attempts made to get the TIG 4.5 curve into even the left coronary artery and 
this was met with much difficulty.  The decision was made to abort this 
approach given the fact if any intervention was needed, we would not get good 
enough support as well.  That sheath was then placed to a pressure bag to 
maintain patency.  The right groin area was then anesthetized with 1 percent 
Lidocaine and the right femoral artery was cannulated and a 6 Moldovan introducer 
was placed.  Coronary arteriography was performed utilizing a 5 Moldovan 4 
Judd right coronary catheter and a 5 Moldovan 4 Judd left coronary 
catheter. Central aorta pressure was recorded using an angled pigtail catheter 
advanced to the ascending aorta.  The catheter was then passed across the 
aortic valve into the left ventricle where left ventricular pressure was 
recorded.  Left ventriculography was then performed utilizing a total of 30 cc 
of Omnipaque dye at a rate of 10 cc per second.  The catheter was then pulled 
back across the aortic valve to recheck gradient.  At the end of the case after 
discussion with the patient, as well as with my partner Dr. Abdon Cage, and 
with the interventionalist Dr. El Demarco from [facility].  The 
decision was made to transfer the patient.  As such, a radial artery band was 
placed on the right radial artery after the sheath was removed and reverse 
Barbeau sign was an A to B.  The right groin area sheath was removed and 
hemostasis was obtained with a closure device after an injection was made to 
assess whether or not it was appropriate for it.  A 6/7 Moldovan Mynx closure 
device was deployed with good hemostasis.  Of note, the Heparin was stopped at 
this time in light of the radial artery band in place.  The radial artery band 
will be weaned by standard protocol.

 

The total contrast used was 135 cc of Omnipaque dye.  The radiation exposure 
included 16.2 minutes of fluoro time.  The air kerma radiation was 1901 
milligray. The DAP radiation was 12,399 microgray per meter square.

 

RESULTS:

 

HEMODYNAMIC DATA:   

   Left heart catheterization - central aortic pressure was recorded at 148/93 
with a mean of 118.  Left ventricular pressure 150/left ventricular end-
diastolic pressure of 19.

 

LEFT VENTRICULOGRAPHY:   

   Performed in the CAMPOS projection revealed apical akinesis.  There was 
hypokinesis of the inferior wall.  The overall ejection fraction was 
approximately 35 to 40 percent.  I did not see significant mitral regurgitation.

 

CORONARY ARTERIOGRAPHY: 

A.  Left coronary artery:   

   1.  Left main - widely patent.   

   2.  Left anterior descending artery - there is diffuse disease seen in the 
proximal left anterior descending artery with a decrease of luminal reduction 
noted to be approximately 40 to 45 percent.  Just after a mid diagonal branch, 
there was critical disease in the continuation of the left anterior descending 
artery with as high as a 90 percent stenosis involving the small diagonal 
branch.  The left anterior descending artery continued to the apical region and 
mildly onto the distal inferior wall.  The mid diagonal branch itself appeared 
to have a 70 to 75 percent proximal obstruction noted.   

   3.  Circumflex artery - a high first obtuse marginal branch appeared to have 
a narrowing as much as 65 percent represent in its proximal portion.  The mid 
portion of the vessel appeared to have a 50 percent obstruction.  The 
continuation of the circumflex supplied a moderate sized mid obtuse marginal 
branch, the proximal portion of the circumflex itself and an area of narrowing 
of 40 to 45 percent. Past the mid obtuse marginal branch, the circumflex was 
totally occluded and retrograde filling was seen to a low lying obtuse marginal 
branch of the circumflex.



B.  Right coronary artery - a dominant vessel supplying multiple acute marginal 
branches, a posterior descending artery and one bifurcating posterior left 
ventricular branch.  There was mild mid disease of 30 to 35 percent.  A low 
lying acute marginal branch, which paralleled the PDA and actually supplied a 
larger territory, including the mid to distal inferior wall, had a significant 
80 to 85 percent proximal/ostial stenosis followed by a segment of 45 to 50 
percent in its proximal portion.  The continuation of the right coronary artery 
was somewhat small caliber, but no critical disease was seen.

 

OVERALL ASSESSMENT:  

   The patient has moderate left ventricular systolic dysfunction as described 
above with significant triple vessel disease involving high grade lesions in 
the mid LAD as well as significant lesions in the proximal portion of a mid 
diagonal branch.  Borderline significant in the proximal portion of the first 
obtuse marginal branch with a totally occluded circumflex with retrograde 
filling to a low lying obtuse marginal branch.  The right coronary artery has 
significant disease involving the low lying acute marginal branch which does 
supply a significant amount of myocardia of mid inferior to distal inferior 
myocardium.  The recommendations were made for bypass surgery.  At this point 
in time, the patient is extremely hesitant about that, but is agreeable for 
transfer to [facility] to hear the opinions regarding bypass 
surgery and intervention as he is more strongly in favor of trying to do 
percutaneous intervention.  I did explain to him he should listen carefully for 
all the opinions and recommendations as given triple vessel disease with left 
ventricular systolic dysfunction and the degree of this disease probably would 
recommend bypass surgery.  I have personally spoken to Dr. Nate Allan, the 
hospitalist who is taking care of the patient, who will start the process of 
transfer to [facility].

 

CC:  Dr. Marquis Sims*

 

 [id number]/[facility] #: [id number]

[id number]

## 2019-12-16 ENCOUNTER — HOSPITAL ENCOUNTER (INPATIENT)
Dept: HOSPITAL 25 - ED | Age: 59
LOS: 5 days | Discharge: HOME | DRG: 638 | End: 2019-12-21
Attending: INTERNAL MEDICINE | Admitting: INTERNAL MEDICINE
Payer: COMMERCIAL

## 2019-12-16 DIAGNOSIS — Z95.5: ICD-10-CM

## 2019-12-16 DIAGNOSIS — Z88.8: ICD-10-CM

## 2019-12-16 DIAGNOSIS — I10: ICD-10-CM

## 2019-12-16 DIAGNOSIS — I25.10: ICD-10-CM

## 2019-12-16 DIAGNOSIS — R00.0: ICD-10-CM

## 2019-12-16 DIAGNOSIS — E78.5: ICD-10-CM

## 2019-12-16 DIAGNOSIS — E78.00: ICD-10-CM

## 2019-12-16 DIAGNOSIS — I25.2: ICD-10-CM

## 2019-12-16 DIAGNOSIS — R20.2: ICD-10-CM

## 2019-12-16 DIAGNOSIS — Z91.19: ICD-10-CM

## 2019-12-16 DIAGNOSIS — E11.10: Primary | ICD-10-CM

## 2019-12-16 DIAGNOSIS — E66.01: ICD-10-CM

## 2019-12-16 DIAGNOSIS — N39.0: ICD-10-CM

## 2019-12-16 LAB
ALBUMIN SERPL BCG-MCNC: 4.1 G/DL (ref 3.2–5.2)
ALBUMIN/GLOB SERPL: 0.9 {RATIO} (ref 1–3)
ALP SERPL-CCNC: 111 U/L (ref 34–104)
ALT SERPL W P-5'-P-CCNC: 9 U/L (ref 7–52)
ANION GAP SERPL CALC-SCNC: 16 MMOL/L (ref 2–11)
ANION GAP SERPL CALC-SCNC: 16 MMOL/L (ref 2–11)
ANION GAP SERPL CALC-SCNC: 21 MMOL/L (ref 2–11)
ANION GAP SERPL CALC-SCNC: 24 MMOL/L (ref 2–11)
AST SERPL-CCNC: 15 U/L (ref 13–39)
BASOPHILS # BLD AUTO: 0 10^3/UL (ref 0–0.2)
BUN SERPL-MCNC: 11 MG/DL (ref 6–24)
BUN SERPL-MCNC: 11 MG/DL (ref 6–24)
BUN SERPL-MCNC: 12 MG/DL (ref 6–24)
BUN SERPL-MCNC: 15 MG/DL (ref 6–24)
BUN/CREAT SERPL: 10.3 (ref 8–20)
BUN/CREAT SERPL: 10.4 (ref 8–20)
BUN/CREAT SERPL: 10.5 (ref 8–20)
BUN/CREAT SERPL: 10.9 (ref 8–20)
CALCIUM SERPL-MCNC: 10 MG/DL (ref 8.6–10.3)
CALCIUM SERPL-MCNC: 8.6 MG/DL (ref 8.6–10.3)
CALCIUM SERPL-MCNC: 9 MG/DL (ref 8.6–10.3)
CALCIUM SERPL-MCNC: 9 MG/DL (ref 8.6–10.3)
CHLORIDE SERPL-SCNC: 100 MMOL/L (ref 101–111)
CHLORIDE SERPL-SCNC: 103 MMOL/L (ref 101–111)
CHLORIDE SERPL-SCNC: 91 MMOL/L (ref 101–111)
CHLORIDE SERPL-SCNC: 98 MMOL/L (ref 101–111)
CK SERPL-CCNC: 94 U/L (ref 10–223)
EOSINOPHIL # BLD AUTO: 0 10^3/UL (ref 0–0.6)
GLOBULIN SER CALC-MCNC: 4.7 G/DL (ref 2–4)
GLUCOSE SERPL-MCNC: 172 MG/DL (ref 70–100)
GLUCOSE SERPL-MCNC: 220 MG/DL (ref 70–100)
GLUCOSE SERPL-MCNC: 356 MG/DL (ref 70–100)
GLUCOSE SERPL-MCNC: 410 MG/DL (ref 70–100)
HCO3 SERPL-SCNC: 18 MMOL/L (ref 22–32)
HCO3 SERPL-SCNC: 18 MMOL/L (ref 22–32)
HCO3 SERPL-SCNC: 20 MMOL/L (ref 22–32)
HCO3 SERPL-SCNC: 20 MMOL/L (ref 22–32)
HCT VFR BLD AUTO: 43 % (ref 42–52)
HGB BLD-MCNC: 14.1 G/DL (ref 14–18)
INR PPP/BLD: 1.07 (ref 0.82–1.09)
LYMPHOCYTES # BLD AUTO: 1.6 10^3/UL (ref 1–4.8)
MCH RBC QN AUTO: 28 PG (ref 27–31)
MCHC RBC AUTO-ENTMCNC: 33 G/DL (ref 31–36)
MCV RBC AUTO: 83 FL (ref 80–94)
MONOCYTES # BLD AUTO: 0.8 10^3/UL (ref 0–0.8)
NEUTROPHILS # BLD AUTO: 4.3 10^3/UL (ref 1.5–7.7)
NRBC # BLD AUTO: 0 10^3/UL
NRBC BLD QL AUTO: 0
PLATELET # BLD AUTO: 194 10^3/UL (ref 150–450)
POTASSIUM SERPL-SCNC: 3.6 MMOL/L (ref 3.5–5)
POTASSIUM SERPL-SCNC: 3.9 MMOL/L (ref 3.5–5)
POTASSIUM SERPL-SCNC: 4 MMOL/L (ref 3.5–5)
POTASSIUM SERPL-SCNC: 4.3 MMOL/L (ref 3.5–5)
PROT SERPL-MCNC: 8.8 G/DL (ref 6.4–8.9)
RBC # BLD AUTO: 5.12 10^6 /UL (ref 4.18–5.48)
SODIUM SERPL-SCNC: 132 MMOL/L (ref 135–145)
SODIUM SERPL-SCNC: 135 MMOL/L (ref 135–145)
SODIUM SERPL-SCNC: 139 MMOL/L (ref 135–145)
SODIUM SERPL-SCNC: 139 MMOL/L (ref 135–145)
TROPONIN I SERPL-MCNC: 0.02 NG/ML (ref ?–0.03)
WBC # BLD AUTO: 6.7 10^3/UL (ref 3.5–10.8)

## 2019-12-16 PROCEDURE — 82803 BLOOD GASES ANY COMBINATION: CPT

## 2019-12-16 PROCEDURE — 85027 COMPLETE CBC AUTOMATED: CPT

## 2019-12-16 PROCEDURE — 85025 COMPLETE CBC W/AUTO DIFF WBC: CPT

## 2019-12-16 PROCEDURE — 83540 ASSAY OF IRON: CPT

## 2019-12-16 PROCEDURE — 81015 MICROSCOPIC EXAM OF URINE: CPT

## 2019-12-16 PROCEDURE — 80048 BASIC METABOLIC PNL TOTAL CA: CPT

## 2019-12-16 PROCEDURE — 99285 EMERGENCY DEPT VISIT HI MDM: CPT

## 2019-12-16 PROCEDURE — 93005 ELECTROCARDIOGRAM TRACING: CPT

## 2019-12-16 PROCEDURE — 80061 LIPID PANEL: CPT

## 2019-12-16 PROCEDURE — 86140 C-REACTIVE PROTEIN: CPT

## 2019-12-16 PROCEDURE — 84484 ASSAY OF TROPONIN QUANT: CPT

## 2019-12-16 PROCEDURE — 86341 ISLET CELL ANTIBODY: CPT

## 2019-12-16 PROCEDURE — 83036 HEMOGLOBIN GLYCOSYLATED A1C: CPT

## 2019-12-16 PROCEDURE — 83550 IRON BINDING TEST: CPT

## 2019-12-16 PROCEDURE — 80053 COMPREHEN METABOLIC PANEL: CPT

## 2019-12-16 PROCEDURE — 83605 ASSAY OF LACTIC ACID: CPT

## 2019-12-16 PROCEDURE — 71045 X-RAY EXAM CHEST 1 VIEW: CPT

## 2019-12-16 PROCEDURE — 81003 URINALYSIS AUTO W/O SCOPE: CPT

## 2019-12-16 PROCEDURE — 87086 URINE CULTURE/COLONY COUNT: CPT

## 2019-12-16 PROCEDURE — 82728 ASSAY OF FERRITIN: CPT

## 2019-12-16 PROCEDURE — 84681 ASSAY OF C-PEPTIDE: CPT

## 2019-12-16 PROCEDURE — 84443 ASSAY THYROID STIM HORMONE: CPT

## 2019-12-16 PROCEDURE — 87641 MR-STAPH DNA AMP PROBE: CPT

## 2019-12-16 PROCEDURE — 82533 TOTAL CORTISOL: CPT

## 2019-12-16 PROCEDURE — 87040 BLOOD CULTURE FOR BACTERIA: CPT

## 2019-12-16 PROCEDURE — 36415 COLL VENOUS BLD VENIPUNCTURE: CPT

## 2019-12-16 PROCEDURE — 82550 ASSAY OF CK (CPK): CPT

## 2019-12-16 PROCEDURE — 85610 PROTHROMBIN TIME: CPT

## 2019-12-16 PROCEDURE — 83735 ASSAY OF MAGNESIUM: CPT

## 2019-12-16 PROCEDURE — 84100 ASSAY OF PHOSPHORUS: CPT

## 2019-12-16 RX ADMIN — HEPARIN SODIUM SCH: 5000 INJECTION INTRAVENOUS; SUBCUTANEOUS at 20:26

## 2019-12-16 RX ADMIN — INSULIN LISPRO SCH UNITS: 100 INJECTION, SOLUTION INTRAVENOUS; SUBCUTANEOUS at 22:18

## 2019-12-16 RX ADMIN — INSULIN LISPRO SCH UNITS: 100 INJECTION, SOLUTION INTRAVENOUS; SUBCUTANEOUS at 18:13

## 2019-12-16 NOTE — ED
HPI Diabetic





- HPI Summary


HPI Summary: 





This patient is a 59 year old M presenting to [facility] accompanied by alex with 

a chief complaint of high blood sugar since today 12/16/19. Per [facility], pt has 

excessive thirst, blurred vision, blood pressure of 144/93, heart rate 98, Fhx 

diabetes (pts father) . Pt reports dizziness, "parched", faintness, blurred 

vision, nausea, foot pain, and yellow sputum (off and on) for past couple of 

weeks. Denies cp, trouble breathing, weakness, fever, vomiting, diarrhea, chills

, erythema of eyes, sore throat, SOB,  abdominal pain, dysuria, hematuria, 

myalgia, edema, rash, and any other cold symptoms but has a cough. Denies prior 

dx of diabetes. Denies current medication consumption and reports last doctor 

visit was a couple years ago. Pt denies smoking hx and has not consumed alcohol 

for a while now. Symptoms aggravated by nothing. Symptoms alleviated by nothing.











- History Of Current Complaint


Chief Complaint: EDDiabeticProb


Time Seen by Provider: 12/16/19 09:16


Hx Obtained From: Patient


Onset/Duration: Lasting Weeks, Still Present


Timing: Constant


Aggravating: Nothing


Alleviating: Nothing


Associated Signs & Symptoms: Cough, Nausea





- Allergies/Home Medications


Allergies/Adverse Reactions: 


 Allergies











Allergy/AdvReac Type Severity Reaction Status Date / Time


 


atenolol Allergy  Bleeding Verified 12/16/19 08:59











Home Medications: 


 Home Medications





Multivitamins/Minerals TAB* [Theragran/minerals TAB*] 1 tab PO DAILY 12/16/19 [

History Confirmed 12/16/19]











PMH/Surg Hx/FS Hx/Imm Hx


Cardiovascular History: Reports: Hx Hypertension - untreated


GI History: Reports: Hx Gastrointestinal Bleed


Sensory History: Reports: Hx Contacts or Glasses


   Denies: Hx Hearing Aid


Opthamlomology History: Reports: Hx Contacts or Glasses





- Surgical History


Surgery Procedure, Year, and Place: stents in heart


Infectious Disease History: No


Infectious Disease History: 


   Denies: Traveled Outside the [place] in Last 30 Days





- Family History


Known Family History: Positive: Hypertension, Diabetes





- Social History


Alcohol Use: None


Hx Substance Use: No


Substance Use Type: Reports: None


Hx Tobacco Use: No


Smoking Status (MU): Never Smoked Tobacco





Review of Systems


Positive: Other - denies weakness.  Negative: Fever, Chills


Positive: Blurred Vision.  Negative: Erythema


Positive: Other - excessive thirst, yellow sputum.  Negative: Sore Throat


Negative: Chest Pain


Positive: Cough, Other - denies trouble breathing.  Negative: Shortness Of 

Breath


Positive: Nausea.  Negative: Abdominal Pain, Vomiting, Diarrhea


Negative: dysuria, hematuria


Positive: Other - foot pain.  Negative: Myalgia, Edema


Negative: Rash


Neurological: Other - dizziness, faintness


All Other Systems Reviewed And Are Negative: Yes





Physical Exam





- Summary


Physical Exam Summary: 





Constitutional: Well-developed, Well-nourished, Alert. (-) Distressed


Skin: hyperpigmentation in lower extremities 


HENT: dry mucous membranes


Eyes: Conjunctiva normal


Neck: Musculoskeletal ROM normal neck. (-) JVD, (-) Stridor, (-) Tracheal 

deviation


Cardio: Rhythm regular, rate normal, Heart sounds normal; Intact distal pulses; 

The pedal pulses are 2+ and symmetric. Radial pulses are 2+ and symmetric. (-) 

Murmur


Pulmonary/Chest wall: Effort normal. (-) Respiratory distress, (-) Wheezes, (-) 

Rales


Abd: Soft, (-) tenderness, (-) Distension, (-) Guarding, (-) Rebound


Musculoskeletal: (-) Edema


Lymph: (-) Cervical adenopathy


Neuro: Alert, Oriented x3


Psych: Mood and affect Normal





Triage Information Reviewed: Yes


Vital Signs On Initial Exam: 


 Initial Vitals











Temp Pulse Resp BP Pulse Ox


 


 97.2 F   141   20   146/105   100 


 


 12/16/19 08:47  12/16/19 08:47  12/16/19 08:47  12/16/19 08:47  12/16/19 08:47











Vital Signs Reviewed: Yes





Diagnostics





- Vital Signs


 Vital Signs











  Temp Pulse Resp BP Pulse Ox


 


 12/16/19 09:05   117   159/113  99


 


 12/16/19 09:04   122    98


 


 12/16/19 08:47  97.2 F  141  20  146/105  100














- Laboratory


Result Diagrams: 


 12/16/19 09:32





 12/16/19 09:32


Lab Statement: Any lab studies that have been ordered have been reviewed, and 

results considered in the medical decision making process.





- Radiology


  ** Chest X-Ray


Radiology Interpretation Completed By: Radiologist


Summary of Radiographic Findings: Per radiologist,.  HYPOINFLATED LUNGS WITH NO 

FOCAL AIRSPACE OPACIFICATION.  ED physician has reviewed this imaging report.





- EKG


  ** 0852


Cardiac Rate: NL - 114 BPM


Summary of EKG Findings: EKG taken at 0852 reveals sinus tachycardia at 114 BPM 

and no STEMI.





Diabetic Course/Dx





- Course


Assessment/Plan: This patient is a 59 year old M presenting to [facility] 

accompanied by alex with a chief complaint of high blood sugar since today 12/ 16/19. Per [facility], pt has excessive thirst, blurred vision, blood pressure of 

144/93, heart rate 98, Fhx diabetes (pts father) . Pt reports dizziness, 

"parched", faintness, blurred vision, nausea, foot pain, and yellow sputum (off 

and on) for past couple of weeks. Denies cp, trouble breathing, weakness, fever

, vomiting, diarrhea, chills, erythema of eyes, sore throat, SOB,  abdominal 

pain, dysuria, hematuria, myalgia, edema, rash, and any other cold symptoms but 

has a cough. Denies prior dx of diabetes.  Physical Exam Findings reveals no 

abnormalities except for dry mucous membranes, hyperpigmentation in lower 

extremities.  EKG taken at 0852 reveals sinus tachycardia at 114 BPM and no 

STEMI.  CXR reveals HYPOINFLATED LUNGS WITH NO FOCAL AIRSPACE OPACIFICATION.  

ED physician has reviewed this radiology report and agrees.  Test results with 

no significant abnormalities expect for  VBG pCO2 40 L, VBG pO2 21.0 L, VBG 

pHCO3 19.3 L, VBG o2 saturation 38.7 L, VBG Base Excess -5.2 L, Chloride 91 L, 

Carbon Dioxide 20 L, Anion Gap 24 H, Creatinine 1.38 H, Glucose 410 H, Alkaline 

Phosphatase 111 H, C-Reactive Protein 44.21 H, Globulin 4.7 H, Albumin/Globulin 

Ratio 0.9 L, Urine Ketones 2+ A, Urine Glucose 3+ (>=500 mg/dl) A.  In the ED 

course the patient was given Insulin Human Regular 5.897 mls/hr IV, saline,.  

We discussed patient care with Dr. Trujillo who accepts pt for admission and Dr. Mcdonald in the ICU. Patient will be admitted with dx of diabetic ketoacidosis. 

The patient is agreeable with this plan.





- Diagnoses


Provider Diagnoses: 


 Diabetic ketoacidosis








- Physician Notifications


Discussed Care Of Patient With: Digna Trujillo


Time Discussed With Above Provider: 10:20


Instructed by Provider To: Other - Dr. Trujillo- accepts for admission; Dr. Mcdonald (ICU)- [id number]





- Critical Care Time


Critical Care Time: 30-74 min - 45 minutes





Discharge ED





- Sign-Out/Discharge


Documenting (check all that apply): Patient Departure - admitted





- Discharge Plan


Disposition: ADMITTED TO [facility]





- Attestation Statements


Document Initiated by Scribe: Yes


Documenting Scribe: Claire Mercado


Provider For Whom Scribe is Documenting (Include Credential): Dr. Trae Cruz MD


Scribe Attestation: 


Claire MADDOX, scribed for Dr. Trae Cruz MD on 12/16/19 at 1242. 


Status of Scribe Document: Ready

## 2019-12-16 NOTE — HP
ADMISSION HISTORY AND PHYSICAL:

 

DATE OF ADMISSION:  12/16/19

 

REASON FOR ADMISSION:  Diabetic ketoacidosis.

 

HISTORY OF PRESENT ILLNESS:  This is a 59-year-old male without a prior history 
of diabetes who presented to the emergency department today with a 1- to 2-week 
history of progressive lethargy associated with increasing thirst and 
increasing nocturia.  In the emergency department, the patient's blood sugar 
was 410 with an anion gap of 24 with a bicarb of 20 - the ABGs showed a pH of 
7.32 and pCO2 of 40.  The patient was alert and oriented, with no complaints of 
chest or abdominal pain, or SOB. EKG showed no evidence of a cardiac ischemia.

 

PAST MEDICAL HISTORY:  The patient received 2 coronary stents approximately 5 
years ago (at [facility]) and is not currently taking anything 
to maintain stent patency.  He claims a past history of hypertension but claims 
it "went away" and BP has cj normal.

 

OUTPATIENT MEDICATIONS:  Multivitamins.

 

FAMILY HISTORY:  The patient has a strong family history of diabetes including 
parents and siblings.

 

SOCIAL HISTORY:  The patient lives with his fiancee and works at [employer] in the 
"maintenance" department.  There is no history of smoking or recent (4 month) 
history of alcohol ingestion or illicit drug use.

 

REVIEW OF SYSTEMS:  Noncontributory.

 

PHYSICAL EXAMINATION

 

GENERAL:  The patient is alert, oriented, and comfortable.

 

VITAL SIGNS:  Temp was 97.2, heart rate 102 and regular, respirations 18 and 
nonlabored, O2 sat 98% on room air, blood pressure was 146/105 on admission, 
and did increase to 160/113 at one point.

 

HEENT:  Pupils were equal and briskly reactive.  There was no facial asymmetry 
and no odor on the breath.

 

NECK:  Supple.

 

LUNGS:  Clear to auscultation.

 

CARDIAC:  Exam revealed a regular rhythm without murmurs or rubs.

 

ABDOMEN:  Soft, nontender, and nondistended.

 

EXTREMITIES:  Warm.  Not edematous and not cyanotic.

 

NEUROLOGIC:  Exam was grossly normal and there were no focal deficits noted.

 

 DIAGNOSTIC STUDIES/LAB DATA:  Admission laboratory data:  Significant labs 
included a normal white count (6.7).  Sodium of 135, carbon dioxide of 20, 
anion gap 24, BUN 15, creatinine 1.38, glucose 410, C-reactive protein 44.2, 
lactate 1.5.  Albumin was normal.

 

EKG revealed sinus tachycardia without acute ST- or T-wave changes.

 

IMPRESSION:  New-onset diabetes with hyperglycemia and probable ketosis (by the 
anion gap), but has not progressed to emerald ketoacidosis.  The patient also 
likely has hypertension.  History of coronary artery disease, but it does not 
appear to be active at this time. There is nothing to indicate and active 
infection.

 

MANAGEMENT PLAN:  We will use an insulin infusion until the ketosis clears (i.e.
, the anion gap normalizes) and then start on a sliding scale insulin regimen.  
There is no need for antibiotics at this time.

 

I have spoken to the patient about the diagnosis of diabetes and the need for 
education, which we will begin in the hospital.

 

CRITICAL CARE TIME:  45 minutes.

 

 

 

[id number]/CPS #: [id number]

SANAZ

## 2019-12-17 LAB
ALBUMIN SERPL BCG-MCNC: 2.9 G/DL (ref 3.2–5.2)
ALBUMIN/GLOB SERPL: 0.9 {RATIO} (ref 1–3)
ALP SERPL-CCNC: 79 U/L (ref 34–104)
ALT SERPL W P-5'-P-CCNC: 7 U/L (ref 7–52)
ANION GAP SERPL CALC-SCNC: 11 MMOL/L (ref 2–11)
ANION GAP SERPL CALC-SCNC: 14 MMOL/L (ref 2–11)
ANION GAP SERPL CALC-SCNC: 14 MMOL/L (ref 2–11)
ANION GAP SERPL CALC-SCNC: 16 MMOL/L (ref 2–11)
AST SERPL-CCNC: 13 U/L (ref 13–39)
BUN SERPL-MCNC: 10 MG/DL (ref 6–24)
BUN/CREAT SERPL: 10 (ref 8–20)
BUN/CREAT SERPL: 10.3 (ref 8–20)
BUN/CREAT SERPL: 10.5 (ref 8–20)
BUN/CREAT SERPL: 9.3 (ref 8–20)
CALCIUM SERPL-MCNC: 8.2 MG/DL (ref 8.6–10.3)
CALCIUM SERPL-MCNC: 9.1 MG/DL (ref 8.6–10.3)
CALCIUM SERPL-MCNC: 9.1 MG/DL (ref 8.6–10.3)
CALCIUM SERPL-MCNC: 9.2 MG/DL (ref 8.6–10.3)
CHLORIDE SERPL-SCNC: 101 MMOL/L (ref 101–111)
CHLORIDE SERPL-SCNC: 101 MMOL/L (ref 101–111)
CHLORIDE SERPL-SCNC: 97 MMOL/L (ref 101–111)
CHLORIDE SERPL-SCNC: 99 MMOL/L (ref 101–111)
GLOBULIN SER CALC-MCNC: 3.2 G/DL (ref 2–4)
GLUCOSE SERPL-MCNC: 157 MG/DL (ref 70–100)
GLUCOSE SERPL-MCNC: 260 MG/DL (ref 70–100)
GLUCOSE SERPL-MCNC: 300 MG/DL (ref 70–100)
GLUCOSE SERPL-MCNC: 371 MG/DL (ref 70–100)
HCO3 SERPL-SCNC: 15 MMOL/L (ref 22–32)
HCO3 SERPL-SCNC: 18 MMOL/L (ref 22–32)
HCO3 SERPL-SCNC: 19 MMOL/L (ref 22–32)
HCO3 SERPL-SCNC: 21 MMOL/L (ref 22–32)
HCT VFR BLD AUTO: 27 % (ref 42–52)
HGB BLD-MCNC: 8.9 G/DL (ref 14–18)
MCH RBC QN AUTO: 28 PG (ref 27–31)
MCHC RBC AUTO-ENTMCNC: 33 G/DL (ref 31–36)
MCV RBC AUTO: 84 FL (ref 80–94)
PLATELET # BLD AUTO: 109 10^3/UL (ref 150–450)
POTASSIUM SERPL-SCNC: (no result) MMOL/L (ref 3.5–5)
POTASSIUM SERPL-SCNC: (no result) MMOL/L (ref 3.5–5)
POTASSIUM SERPL-SCNC: 3.8 MMOL/L (ref 3.5–5)
POTASSIUM SERPL-SCNC: 3.9 MMOL/L (ref 3.5–5)
PROT SERPL-MCNC: 6.1 G/DL (ref 6.4–8.9)
RBC # BLD AUTO: 3.18 10^6 /UL (ref 4.18–5.48)
SODIUM SERPL-SCNC: 130 MMOL/L (ref 135–145)
SODIUM SERPL-SCNC: 131 MMOL/L (ref 135–145)
SODIUM SERPL-SCNC: 132 MMOL/L (ref 135–145)
SODIUM SERPL-SCNC: 133 MMOL/L (ref 135–145)
WBC # BLD AUTO: 4.7 10^3/UL (ref 3.5–10.8)

## 2019-12-17 RX ADMIN — INSULIN GLARGINE SCH UNITS: 100 INJECTION, SOLUTION SUBCUTANEOUS at 20:52

## 2019-12-17 RX ADMIN — INSULIN HUMAN SCH MLS/HR: 100 INJECTION, SOLUTION PARENTERAL at 12:53

## 2019-12-17 RX ADMIN — HEPARIN SODIUM SCH UNITS: 5000 INJECTION INTRAVENOUS; SUBCUTANEOUS at 08:27

## 2019-12-17 RX ADMIN — HEPARIN SODIUM SCH UNITS: 5000 INJECTION INTRAVENOUS; SUBCUTANEOUS at 20:52

## 2019-12-17 RX ADMIN — INSULIN LISPRO SCH UNITS: 100 INJECTION, SOLUTION INTRAVENOUS; SUBCUTANEOUS at 08:26

## 2019-12-17 RX ADMIN — INSULIN GLARGINE SCH UNITS: 100 INJECTION, SOLUTION SUBCUTANEOUS at 08:27

## 2019-12-17 RX ADMIN — SODIUM CHLORIDE SCH MLS/HR: 900 IRRIGANT IRRIGATION at 18:37

## 2019-12-17 NOTE — PN
Date of Service: 12/17/19


Critical Care Services: 


Blood glucose back up into mid-300s this AM - AG continues at 16 - therefore 

restarted the insulin drip at 0.05 units/kg/hr.





Vital Signs: 











Temp Pulse Resp BP SpO2 FiO2


 


98.4 F 91 27 160/95 100 











Physical Exam: 


Gen: Resting comfortably in bed.





HEENT: Normal





Lungs: Clear





Cardiac:  Reg rhythm





Abdomen: Not distended





Extremities: No cyanosis or edema








Fluid Balance (Past 24 Hours): 











 12/17/19 12/18/19





 06:59 06:59


 


Intake Total 5306 998.3


 


Output Total 2300 1450


 


Balance 3006 -451.7


 


Weight 267 lb 3.2 oz 


 


Intake:  


 


  IV Fluids 5004 378


 


    D5W NS 40 meq KCL 1004 378


 


    NS 1000 


 


  Medicated IV 12 20.3


 


    INSULIN 12 20.3


 


  Oral 290 600


 


Output:  


 


  Urine 2300 1450








Labs: 


 Laboratory Results - last 24 hr











  12/16/19 12/16/19 12/16/19





  14:05 15:03 15:06


 


WBC   


 


RBC   


 


Hgb   


 


Hct   


 


MCV   


 


MCH   


 


MCHC   


 


RDW   


 


Plt Count   


 


MPV   


 


Sodium   139 


 


Potassium   3.9 


 


Chloride   103 


 


Carbon Dioxide   20 L 


 


Anion Gap   16 H 


 


BUN   11 


 


Creatinine   1.06 


 


Est GFR ( Amer)   85.9 


 


Est GFR (Non-Af Amer)   71.0 


 


BUN/Creatinine Ratio   10.4 


 


Glucose   172 H 


 


POC Glucose (mg/dL)  169 H   160 H


 


Calcium   8.6 


 


Total Bilirubin   


 


AST   


 


ALT   


 


Alkaline Phosphatase   


 


Total Protein   


 


Albumin   


 


Globulin   


 


Albumin/Globulin Ratio   














  12/16/19 12/16/19 12/16/19





  16:15 18:01 21:25


 


WBC   


 


RBC   


 


Hgb   


 


Hct   


 


MCV   


 


MCH   


 


MCHC   


 


RDW   


 


Plt Count   


 


MPV   


 


Sodium    132 L


 


Potassium    4.3


 


Chloride    98 L


 


Carbon Dioxide    18 L


 


Anion Gap    16 H


 


BUN    11


 


Creatinine    1.05


 


Est GFR ( Amer)    86.9


 


Est GFR (Non-Af Amer)    71.8


 


BUN/Creatinine Ratio    10.5


 


Glucose    356 H


 


POC Glucose (mg/dL)  152 H  153 H 


 


Calcium    9.0


 


Total Bilirubin   


 


AST   


 


ALT   


 


Alkaline Phosphatase   


 


Total Protein   


 


Albumin   


 


Globulin   


 


Albumin/Globulin Ratio   














  12/17/19 12/17/19 12/17/19





  05:12 05:12 08:13


 


WBC  4.7  


 


RBC  3.18 L  


 


Hgb  8.9 L  


 


Hct  27 L  


 


MCV  84  


 


MCH  28  


 


MCHC  33  


 


RDW  14  


 


Plt Count  109 L  


 


MPV  9.7  


 


Sodium   132 L 


 


Potassium   3.8 


 


Chloride   101 


 


Carbon Dioxide   15 L 


 


Anion Gap   16 H 


 


BUN   10 


 


Creatinine   0.95 


 


Est GFR ( Amer)   97.5 


 


Est GFR (Non-Af Amer)   80.6 


 


BUN/Creatinine Ratio   10.5 


 


Glucose   300 H 


 


POC Glucose (mg/dL)    289 H


 


Calcium   8.2 L 


 


Total Bilirubin   0.30 


 


AST   13 


 


ALT   7 


 


Alkaline Phosphatase   79 


 


Total Protein   6.1 L 


 


Albumin   2.9 L 


 


Globulin   3.2 


 


Albumin/Globulin Ratio   0.9 L 














  12/17/19 12/17/19 12/17/19





  12:02 12:07 14:01


 


WBC   


 


RBC   


 


Hgb   


 


Hct   


 


MCV   


 


MCH   


 


MCHC   


 


RDW   


 


Plt Count   


 


MPV   


 


Sodium  130 L  


 


Potassium  TNP  


 


Chloride  97 L  


 


Carbon Dioxide  19 L  


 


Anion Gap  14 H  


 


BUN  10  


 


Creatinine  1.08  


 


Est GFR ( Amer)  84.1  


 


Est GFR (Non-Af Amer)  69.5  


 


BUN/Creatinine Ratio  9.3  


 


Glucose  371 H  


 


POC Glucose (mg/dL)   385 H  342 H


 


Calcium  9.2  


 


Total Bilirubin   


 


AST   


 


ALT   


 


Alkaline Phosphatase   


 


Total Protein   


 


Albumin   


 


Globulin   


 


Albumin/Globulin Ratio   











Studies: 


None today





Nutrition: 


NPO while on the insulin drip.





Impression: 


Probable continuing ketosis without acidemia





Plan: 


Insulin drip until AG normalized, then start sliding scale coverage with lantus 

background.

## 2019-12-18 LAB
ANION GAP SERPL CALC-SCNC: 11 MMOL/L (ref 2–11)
ANION GAP SERPL CALC-SCNC: 12 MMOL/L (ref 2–11)
ANION GAP SERPL CALC-SCNC: 15 MMOL/L (ref 2–11)
ANION GAP SERPL CALC-SCNC: 9 MMOL/L (ref 2–11)
BASOPHILS # BLD AUTO: 0 10^3/UL (ref 0–0.2)
BUN SERPL-MCNC: 11 MG/DL (ref 6–24)
BUN SERPL-MCNC: 7 MG/DL (ref 6–24)
BUN SERPL-MCNC: 7 MG/DL (ref 6–24)
BUN SERPL-MCNC: 8 MG/DL (ref 6–24)
BUN/CREAT SERPL: 11.2 (ref 8–20)
BUN/CREAT SERPL: 7.1 (ref 8–20)
BUN/CREAT SERPL: 8.1 (ref 8–20)
BUN/CREAT SERPL: 8.9 (ref 8–20)
CALCIUM SERPL-MCNC: 8.7 MG/DL (ref 8.6–10.3)
CALCIUM SERPL-MCNC: 8.8 MG/DL (ref 8.6–10.3)
CALCIUM SERPL-MCNC: 8.8 MG/DL (ref 8.6–10.3)
CALCIUM SERPL-MCNC: 9.1 MG/DL (ref 8.6–10.3)
CHLORIDE SERPL-SCNC: 100 MMOL/L (ref 101–111)
CHLORIDE SERPL-SCNC: 97 MMOL/L (ref 101–111)
CHLORIDE SERPL-SCNC: 99 MMOL/L (ref 101–111)
CHLORIDE SERPL-SCNC: 99 MMOL/L (ref 101–111)
EOSINOPHIL # BLD AUTO: 0.1 10^3/UL (ref 0–0.6)
GLUCOSE SERPL-MCNC: 168 MG/DL (ref 70–100)
GLUCOSE SERPL-MCNC: 177 MG/DL (ref 70–100)
GLUCOSE SERPL-MCNC: 208 MG/DL (ref 70–100)
GLUCOSE SERPL-MCNC: 242 MG/DL (ref 70–100)
HCO3 SERPL-SCNC: 19 MMOL/L (ref 22–32)
HCO3 SERPL-SCNC: 22 MMOL/L (ref 22–32)
HCO3 SERPL-SCNC: 22 MMOL/L (ref 22–32)
HCO3 SERPL-SCNC: 25 MMOL/L (ref 22–32)
HCT VFR BLD AUTO: 35 % (ref 42–52)
HGB BLD-MCNC: 11.7 G/DL (ref 14–18)
LYMPHOCYTES # BLD AUTO: 2.3 10^3/UL (ref 1–4.8)
MCH RBC QN AUTO: 28 PG (ref 27–31)
MCHC RBC AUTO-ENTMCNC: 33 G/DL (ref 31–36)
MCV RBC AUTO: 84 FL (ref 80–94)
MONOCYTES # BLD AUTO: 0.7 10^3/UL (ref 0–0.8)
NEUTROPHILS # BLD AUTO: 2.2 10^3/UL (ref 1.5–7.7)
NRBC # BLD AUTO: 0 10^3/UL
NRBC BLD QL AUTO: 0.1
PLATELET # BLD AUTO: 135 10^3/UL (ref 150–450)
POTASSIUM SERPL-SCNC: (no result) MMOL/L (ref 3.5–5)
POTASSIUM SERPL-SCNC: 3.4 MMOL/L (ref 3.5–5)
POTASSIUM SERPL-SCNC: 3.6 MMOL/L (ref 3.5–5)
POTASSIUM SERPL-SCNC: 3.9 MMOL/L (ref 3.5–5)
RBC # BLD AUTO: 4.2 10^6 /UL (ref 4.18–5.48)
SODIUM SERPL-SCNC: 131 MMOL/L (ref 135–145)
SODIUM SERPL-SCNC: 132 MMOL/L (ref 135–145)
SODIUM SERPL-SCNC: 133 MMOL/L (ref 135–145)
SODIUM SERPL-SCNC: 134 MMOL/L (ref 135–145)
WBC # BLD AUTO: 5.4 10^3/UL (ref 3.5–10.8)

## 2019-12-18 RX ADMIN — MAGNESIUM HYDROXIDE PRN ML: 400 SUSPENSION ORAL at 21:42

## 2019-12-18 RX ADMIN — HEPARIN SODIUM SCH UNITS: 5000 INJECTION INTRAVENOUS; SUBCUTANEOUS at 21:42

## 2019-12-18 RX ADMIN — INSULIN LISPRO SCH UNITS: 100 INJECTION, SOLUTION INTRAVENOUS; SUBCUTANEOUS at 20:30

## 2019-12-18 RX ADMIN — HEPARIN SODIUM SCH UNITS: 5000 INJECTION INTRAVENOUS; SUBCUTANEOUS at 10:00

## 2019-12-18 RX ADMIN — INSULIN HUMAN SCH MLS/HR: 100 INJECTION, SOLUTION PARENTERAL at 06:37

## 2019-12-18 RX ADMIN — INSULIN LISPRO SCH UNITS: 100 INJECTION, SOLUTION INTRAVENOUS; SUBCUTANEOUS at 04:50

## 2019-12-18 RX ADMIN — INSULIN LISPRO SCH UNITS: 100 INJECTION, SOLUTION INTRAVENOUS; SUBCUTANEOUS at 00:26

## 2019-12-18 RX ADMIN — SODIUM CHLORIDE SCH MLS/HR: 900 IRRIGANT IRRIGATION at 04:10

## 2019-12-18 RX ADMIN — INSULIN LISPRO SCH UNITS: 100 INJECTION, SOLUTION INTRAVENOUS; SUBCUTANEOUS at 15:51

## 2019-12-18 NOTE — PN
Date of Service: 12/18/19


Critical Care Services: 


Back on insulin drip after anion gap and blood glucose increased on sliding 

scale insulin. Hb A1c is 18%.





Vital Signs: 











Temp Pulse Resp BP SpO2 FiO2


 


98.9 F 106 20 134/91 96 











Physical Exam: 


Gen: Alert, oriented, comfortable


HEENT: No facial asymmetry


Lungs:Clear


Cardiac:  No murmurs. Rhythm regular


Abdomen: Not distended


Extremities: No cyanosis or edema





Fluid Balance (Past 24 Hours): 











 12/17/19 12/18/19





 06:59 06:59


 


Intake Total 5306 2439.3


 


Output Total 2300 2200


 


Balance 3006 239.3


 


Weight 267 lb 3.2 oz 275 lb 9.245 oz


 


Intake:  


 


  IV Fluids 5004 1784


 


    D5W NS 40 meq KCL 1004 378


 


    NS 1000 1406


 


  Medicated IV 12 55.3


 


    INSULIN 12 55.3


 


  Oral 290 600


 


Output:  


 


  Urine 2300 2200








Labs: 


 Laboratory Results - last 24 hr











  12/17/19 12/17/19 12/17/19





  12:02 12:07 14:01


 


WBC   


 


RBC   


 


Hgb   


 


Hct   


 


MCV   


 


MCH   


 


MCHC   


 


RDW   


 


Plt Count   


 


MPV   


 


Neut % (Auto)   


 


Lymph % (Auto)   


 


Mono % (Auto)   


 


Eos % (Auto)   


 


Baso % (Auto)   


 


Absolute Neuts (auto)   


 


Absolute Lymphs (auto)   


 


Absolute Monos (auto)   


 


Absolute Eos (auto)   


 


Absolute Basos (auto)   


 


Absolute Nucleated RBC   


 


Nucleated RBC %   


 


Sodium  130 L  


 


Potassium  TNP  


 


Chloride  97 L  


 


Carbon Dioxide  19 L  


 


Anion Gap  14 H  


 


BUN  10  


 


Creatinine  1.08  


 


Est GFR ( Amer)  84.1  


 


Est GFR (Non-Af Amer)  69.5  


 


BUN/Creatinine Ratio  9.3  


 


Glucose  371 H  


 


POC Glucose (mg/dL)   385 H  342 H


 


Hemoglobin A1c   


 


Calcium  9.2  














  12/17/19 12/17/19 12/17/19





  15:01 15:01 15:06


 


WBC   


 


RBC   


 


Hgb   


 


Hct   


 


MCV   


 


MCH   


 


MCHC   


 


RDW   


 


Plt Count   


 


MPV   


 


Neut % (Auto)   


 


Lymph % (Auto)   


 


Mono % (Auto)   


 


Eos % (Auto)   


 


Baso % (Auto)   


 


Absolute Neuts (auto)   


 


Absolute Lymphs (auto)   


 


Absolute Monos (auto)   


 


Absolute Eos (auto)   


 


Absolute Basos (auto)   


 


Absolute Nucleated RBC   


 


Nucleated RBC %   


 


Sodium  131 L  


 


Potassium  TNP  


 


Chloride  99 L  


 


Carbon Dioxide  18 L  


 


Anion Gap  14 H  


 


BUN  10  


 


Creatinine  1.00  


 


Est GFR ( Amer)  91.9  


 


Est GFR (Non-Af Amer)  76.0  


 


BUN/Creatinine Ratio  10.0  


 


Glucose  260 H  


 


POC Glucose (mg/dL)    250 H


 


Hemoglobin A1c   18.0 H 


 


Calcium  9.1  














  12/17/19 12/17/19 12/17/19





  16:01 16:22 17:09


 


WBC   


 


RBC   


 


Hgb   


 


Hct   


 


MCV   


 


MCH   


 


MCHC   


 


RDW   


 


Plt Count   


 


MPV   


 


Neut % (Auto)   


 


Lymph % (Auto)   


 


Mono % (Auto)   


 


Eos % (Auto)   


 


Baso % (Auto)   


 


Absolute Neuts (auto)   


 


Absolute Lymphs (auto)   


 


Absolute Monos (auto)   


 


Absolute Eos (auto)   


 


Absolute Basos (auto)   


 


Absolute Nucleated RBC   


 


Nucleated RBC %   


 


Sodium    133 L


 


Potassium   4.3  3.9


 


Chloride    101


 


Carbon Dioxide    21 L


 


Anion Gap    11


 


BUN    10


 


Creatinine    0.97


 


Est GFR ( Amer)    95.2


 


Est GFR (Non-Af Amer)    78.7


 


BUN/Creatinine Ratio    10.3


 


Glucose    157 H


 


POC Glucose (mg/dL)  205 H  


 


Hemoglobin A1c   


 


Calcium    9.1














  12/17/19 12/17/19 12/17/19





  17:09 18:06 20:07


 


WBC   


 


RBC   


 


Hgb   


 


Hct   


 


MCV   


 


MCH   


 


MCHC   


 


RDW   


 


Plt Count   


 


MPV   


 


Neut % (Auto)   


 


Lymph % (Auto)   


 


Mono % (Auto)   


 


Eos % (Auto)   


 


Baso % (Auto)   


 


Absolute Neuts (auto)   


 


Absolute Lymphs (auto)   


 


Absolute Monos (auto)   


 


Absolute Eos (auto)   


 


Absolute Basos (auto)   


 


Absolute Nucleated RBC   


 


Nucleated RBC %   


 


Sodium   


 


Potassium   


 


Chloride   


 


Carbon Dioxide   


 


Anion Gap   


 


BUN   


 


Creatinine   


 


Est GFR ( Amer)   


 


Est GFR (Non-Af Amer)   


 


BUN/Creatinine Ratio   


 


Glucose   


 


POC Glucose (mg/dL)  150 H  115 H  129 H


 


Hemoglobin A1c   


 


Calcium   














  12/18/19 12/18/19 12/18/19





  00:20 04:36 05:13


 


WBC    5.4


 


RBC    4.20


 


Hgb    11.7 L


 


Hct    35 L


 


MCV    84


 


MCH    28


 


MCHC    33


 


RDW    14


 


Plt Count    135 L


 


MPV    9.6


 


Neut % (Auto)    41.9


 


Lymph % (Auto)    43.3


 


Mono % (Auto)    12.7


 


Eos % (Auto)    1.4


 


Baso % (Auto)    0.7


 


Absolute Neuts (auto)    2.2


 


Absolute Lymphs (auto)    2.3


 


Absolute Monos (auto)    0.7


 


Absolute Eos (auto)    0.1


 


Absolute Basos (auto)    0.0


 


Absolute Nucleated RBC    0.0


 


Nucleated RBC %    0.1


 


Sodium   


 


Potassium   


 


Chloride   


 


Carbon Dioxide   


 


Anion Gap   


 


BUN   


 


Creatinine   


 


Est GFR ( Amer)   


 


Est GFR (Non-Af Amer)   


 


BUN/Creatinine Ratio   


 


Glucose   


 


POC Glucose (mg/dL)  193 H  261 H 


 


Hemoglobin A1c   


 


Calcium   














  12/18/19 12/18/19 12/18/19





  05:13 07:38 08:18


 


WBC   


 


RBC   


 


Hgb   


 


Hct   


 


MCV   


 


MCH   


 


MCHC   


 


RDW   


 


Plt Count   


 


MPV   


 


Neut % (Auto)   


 


Lymph % (Auto)   


 


Mono % (Auto)   


 


Eos % (Auto)   


 


Baso % (Auto)   


 


Absolute Neuts (auto)   


 


Absolute Lymphs (auto)   


 


Absolute Monos (auto)   


 


Absolute Eos (auto)   


 


Absolute Basos (auto)   


 


Absolute Nucleated RBC   


 


Nucleated RBC %   


 


Sodium  131 L  


 


Potassium  3.9  


 


Chloride  97 L  


 


Carbon Dioxide  19 L  


 


Anion Gap  15 H  


 


BUN  11  


 


Creatinine  0.98  


 


Est GFR ( Amer)  94.1  


 


Est GFR (Non-Af Amer)  77.8  


 


BUN/Creatinine Ratio  11.2  


 


Glucose  242 H  


 


POC Glucose (mg/dL)   211 H  234 H


 


Hemoglobin A1c   


 


Calcium  8.7  











Studies: 


None today





Nutrition: 


patient is NPO while on an insulin drip





Impression: 


Ketosis still not entirely cleared, requiring repeated infusions of insulin. 

Hemoglobin A1c level of 18% indicates very poor glycemic control for a 

prolonged period of time prior to admission. Low albumin (2.9 mg/dL) will also 

spuriously decrease the anion gap, so we must also take this into consideration 

(and use a lower anion gap as a target of the insulin infusion)..





Plan: 


Continue insulin infusion until anion gap down to about 9 mEq/L. Keep the 

patient NPO for now - will refeed when insulin infusion is off.








Critical Care Time: 30 minutes

## 2019-12-19 LAB
ANION GAP SERPL CALC-SCNC: 10 MMOL/L (ref 2–11)
ANION GAP SERPL CALC-SCNC: 11 MMOL/L (ref 2–11)
BUN SERPL-MCNC: 6 MG/DL (ref 6–24)
BUN SERPL-MCNC: 7 MG/DL (ref 6–24)
BUN/CREAT SERPL: 7.5 (ref 8–20)
BUN/CREAT SERPL: 8.9 (ref 8–20)
CALCIUM SERPL-MCNC: 7.6 MG/DL (ref 8.6–10.3)
CALCIUM SERPL-MCNC: 8.3 MG/DL (ref 8.6–10.3)
CHLORIDE SERPL-SCNC: 103 MMOL/L (ref 101–111)
CHLORIDE SERPL-SCNC: 105 MMOL/L (ref 101–111)
GLUCOSE SERPL-MCNC: 175 MG/DL (ref 70–100)
GLUCOSE SERPL-MCNC: 179 MG/DL (ref 70–100)
HCO3 SERPL-SCNC: 20 MMOL/L (ref 22–32)
HCO3 SERPL-SCNC: 23 MMOL/L (ref 22–32)
MAGNESIUM SERPL-MCNC: 1.6 MG/DL (ref 1.9–2.7)
MAGNESIUM SERPL-MCNC: 2.1 MG/DL (ref 1.9–2.7)
POTASSIUM SERPL-SCNC: 3.1 MMOL/L (ref 3.5–5)
POTASSIUM SERPL-SCNC: 3.5 MMOL/L (ref 3.5–5)
SODIUM SERPL-SCNC: 135 MMOL/L (ref 135–145)
SODIUM SERPL-SCNC: 137 MMOL/L (ref 135–145)

## 2019-12-19 RX ADMIN — INSULIN LISPRO SCH UNIT: 100 INJECTION, SOLUTION INTRAVENOUS; SUBCUTANEOUS at 13:48

## 2019-12-19 RX ADMIN — HEPARIN SODIUM SCH UNITS: 5000 INJECTION INTRAVENOUS; SUBCUTANEOUS at 21:39

## 2019-12-19 RX ADMIN — INSULIN LISPRO SCH UNIT: 100 INJECTION, SOLUTION INTRAVENOUS; SUBCUTANEOUS at 18:23

## 2019-12-19 RX ADMIN — HEPARIN SODIUM SCH UNITS: 5000 INJECTION INTRAVENOUS; SUBCUTANEOUS at 09:37

## 2019-12-19 RX ADMIN — INSULIN LISPRO SCH: 100 INJECTION, SOLUTION INTRAVENOUS; SUBCUTANEOUS at 13:33

## 2019-12-19 RX ADMIN — INSULIN LISPRO SCH UNIT: 100 INJECTION, SOLUTION INTRAVENOUS; SUBCUTANEOUS at 09:34

## 2019-12-19 RX ADMIN — INSULIN LISPRO SCH UNITS: 100 INJECTION, SOLUTION INTRAVENOUS; SUBCUTANEOUS at 05:39

## 2019-12-19 RX ADMIN — INSULIN GLARGINE SCH UNIT: 100 INJECTION, SOLUTION SUBCUTANEOUS at 09:35

## 2019-12-19 RX ADMIN — METFORMIN HYDROCHLORIDE SCH MG: 500 TABLET, FILM COATED ORAL at 18:25

## 2019-12-19 RX ADMIN — INSULIN LISPRO SCH: 100 INJECTION, SOLUTION INTRAVENOUS; SUBCUTANEOUS at 18:22

## 2019-12-19 RX ADMIN — INSULIN LISPRO SCH UNITS: 100 INJECTION, SOLUTION INTRAVENOUS; SUBCUTANEOUS at 00:31

## 2019-12-19 RX ADMIN — INSULIN LISPRO SCH UNIT: 100 INJECTION, SOLUTION INTRAVENOUS; SUBCUTANEOUS at 09:33

## 2019-12-19 RX ADMIN — INSULIN LISPRO SCH UNIT: 100 INJECTION, SOLUTION INTRAVENOUS; SUBCUTANEOUS at 13:49

## 2019-12-19 RX ADMIN — MAGNESIUM HYDROXIDE PRN ML: 400 SUSPENSION ORAL at 06:03

## 2019-12-19 RX ADMIN — MAGNESIUM HYDROXIDE PRN ML: 400 SUSPENSION ORAL at 13:52

## 2019-12-19 RX ADMIN — INSULIN LISPRO SCH UNIT: 100 INJECTION, SOLUTION INTRAVENOUS; SUBCUTANEOUS at 21:38

## 2019-12-19 NOTE — PN
Progress Note





- Progress Note


Date of Service: 12/19/19


Note: 


Patient with sinus tachycardia this AM - no complaints of chest pain or dyspnea 

- Heart rate has fluctuated from 110 to as high as 160 bpm. Currently at 98 

bpm. Patient being transferred to telemetry for further monitoring. No signs of 

a pathological process at the present time.

## 2019-12-19 NOTE — PN
Date of Service: 12/19/19


Critical Care Services: 


Doing better - AG has decreased to 10-11 and tolerating oral diet. Seen by Dr Swanson this AM.





Vital Signs: 











Temp Pulse Resp BP SpO2 FiO2


 


98.6 F 96 13 156/86 95 











Physical Exam: 


Gen: Alert, oriented





HEENT: No change





Lungs: Clear





Cardiac:  reg rhythm





Abdomen: Not distended





Extremities: No cyanosis or edema








Fluid Balance (Past 24 Hours): 


I=     O=     Net 


 Intake & Output











 12/18/19 12/19/19





 06:59 06:59


 


Intake Total 2439.3 3315


 


Output Total 2200 4670


 


Balance 239.3 -1355


 


Weight 275 lb 9.245 oz 274 lb 9 oz


 


Intake:  


 


  IV Fluids 1784 2916


 


    D5W NS (0.9%)  1451


 


    D5W NS 40 meq  


 


    NS 1406 762


 


    NS (0.9%) 20 meq KCL  703


 


  IVPB  55


 


    Magnesium  55


 


  Medicated IV 55.3 44


 


    INSULIN 55.3 44


 


  Oral 600 300


 


Output:  


 


  Urine 2200 4670








Labs: 


 Laboratory Results - last 24 hr











  12/18/19 12/18/19 12/18/19





  09:20 09:32 10:13


 


Sodium  132 L  


 


Potassium  3.4 L  


 


Chloride  99 L  


 


Carbon Dioxide  22  


 


Anion Gap  11  


 


BUN  8  


 


Creatinine  0.90  


 


Est GFR ( Amer)  103.8  


 


Est GFR (Non-Af Amer)  85.8  


 


BUN/Creatinine Ratio  8.9  


 


Glucose  208 H  


 


POC Glucose (mg/dL)   196 H  192 H


 


Calcium  8.8  


 


Phosphorus   


 


Magnesium   














  12/18/19 12/18/19 12/18/19





  11:18 11:59 13:00


 


Sodium    134 L


 


Potassium    3.6


 


Chloride    100 L


 


Carbon Dioxide    25


 


Anion Gap    9


 


BUN    7


 


Creatinine    0.86


 


Est GFR ( Amer)    109.4


 


Est GFR (Non-Af Amer)    90.4


 


BUN/Creatinine Ratio    8.1


 


Glucose    168 H


 


POC Glucose (mg/dL)  203 H  171 H 


 


Calcium    8.8


 


Phosphorus   


 


Magnesium   














  12/18/19 12/18/19 12/18/19





  13:04 15:04 17:45


 


Sodium    133 L


 


Potassium    TNP


 


Chloride    99 L


 


Carbon Dioxide    22


 


Anion Gap    12 H


 


BUN    7


 


Creatinine    0.99


 


Est GFR ( Amer)    93.0


 


Est GFR (Non-Af Amer)    76.9


 


BUN/Creatinine Ratio    7.1 L


 


Glucose    177 H


 


POC Glucose (mg/dL)  159 H  147 H 


 


Calcium    9.1


 


Phosphorus   


 


Magnesium   














  12/18/19 12/18/19 12/18/19





  19:56 23:42 23:42


 


Sodium   135 


 


Potassium   3.1 L 


 


Chloride   105 


 


Carbon Dioxide   20 L 


 


Anion Gap   10 


 


BUN   7 


 


Creatinine   0.79 


 


Est GFR ( Amer)   120.7 


 


Est GFR (Non-Af Amer)   99.7 


 


BUN/Creatinine Ratio   8.9 


 


Glucose   179 H 


 


POC Glucose (mg/dL)  203 H   173 H


 


Calcium   7.6 L 


 


Phosphorus   3.1 


 


Magnesium   1.6 L 














  12/19/19 12/19/19 12/19/19





  05:32 05:33 08:47


 


Sodium  137  


 


Potassium  3.5  


 


Chloride  103  


 


Carbon Dioxide  23  


 


Anion Gap  11  


 


BUN  6  


 


Creatinine  0.80  


 


Est GFR ( Amer)  118.9  


 


Est GFR (Non-Af Amer)  98.3  


 


BUN/Creatinine Ratio  7.5 L  


 


Glucose  175 H  


 


POC Glucose (mg/dL)   170 H  213 H


 


Calcium  8.3 L  


 


Phosphorus   


 


Magnesium  2.1  











Studies: 


None today





Nutrition: 


Consistent carb diet





Impression: 


Ketosis has resolved





Plan: 


Transfer out of ICU to service of Dr. Swanson

## 2019-12-19 NOTE — CONS
CC:  Dr. Stephan Mcdonald *

 

CONSULTATION REPORT:

 

DATE OF CONSULT:  12/19/19

 

REASON FOR CONSULT:  Endocrine consultation for new onset diabetes mellitus.

 

HISTORY OF PRESENT ILLNESS:  Rosendo Souza is a 59-year-old male.  His 
presentation is documented in Dr. Stephan Mcdonald's admitting history and physical.
  In short, he has a history of coronary artery disease, but has not been 
taking any medication due to side effects.  He had progressive lethargy over 1 
to 2 weeks and frequent urination.  He had an episode of syncope.  He was 
brought to the hospital. His glucose was 410, anion gap 24, bicarbonate 20, he 
had a pH of 7.32, PCO2 40, lactate of 1.5, CRP of 44.2, creatinine of 1.38, BUN 
of 15.  He had a sinus tachycardia on his EKG.

 

Diabetes history:  According to the patient, he has had 20-pound weight loss 
over the last few weeks though over the last 2 years, he has lost 50 pounds.  
He has had intense fatigue, thirst, excessive urination, blurred vision for 
approximately 2 weeks.

 

PAST MEDICAL HISTORY:  He denies a history of surgery or hospitalization though 
on close questioning, he has coronary artery disease and 3 years ago, had a 
myocardial infarction x3, coronary artery stents in [place].  He states that 
he did not maintain his medication because it had side effects, for example; 
coughing.  He was unable to name any of the medication he was placed on and he 
has not stayed on any aspirin.  Hence, he was on no medications when he came 
into the hospital.

 

Risk factor:  No history of pancreatitis, steroid use.  He does not smoke, has 
not drunk alcohol for several years.

 

FAMILY HISTORY:  Positive for both parents having type 2 diabetes.  He had 7 
sibs, at least 3 brothers have type 2 diabetes and 1 sister.  He has not kept 
in touch with his family and does not know many more details.

 

SOCIAL HISTORY:  He is a  at [employer].

 

REVIEW OF SYSTEMS:  He has some numbness and tingling in his feet, which is new 
over the last two weeks.  Otherwise no focal complaints.

 

PHYSICAL EXAM:  He is 6 foot, 274 pounds, body mass index 37.2, temperature 98.6
, pulse rate 96, respirations 30, oxygen saturation 95%, blood pressure 156/86.
  He has no cyanosis, anemia, jaundice, clubbing or lymphadenopathy.  
Cardiovascular System:  His pulse is regular, normal character, volume. Venous 
pressure was not elevated.  Heart sounds were normal.  No added sounds, or 
murmurs.  He has no pedal edema. Pedal pulses are present.  Respiratory System:
  Chest expansion is full and symmetrical.  Percussion note resonant.  Breath 
sounds vesicular, no crackles or wheezes.  Abdomen:  No masses, tenderness or 
organomegaly.  Endocrine System:  He is clinically euthyroid, has no palpable 
thyroid tissue.  He has central obesity, but no other signs of Cushing 
syndrome.  He has no signs of acromegaly.  He has no xanthelasmata or tendon 
xanthomata.  Eye Examination:  He has no diabetic retinopathy.  Foot Examination
:  He has very dry skin on his feet, which cracking. He has no infections or 
sores or ulcers.

 

INVESTIGATIONS:  At presentation CBC:  White count 6.7, hemoglobin 14.1, 
hematocrit 43, platelets 194, normal differential.  Chemistry at presentation:  
Abnormals, chloride 91, bicarbonate 20, BUN 15, creatinine 1.38, glucose 410, 
, CRP 44.21, globulin 4.7.

 

Labs today normal aside from the glucose of 175 and a calcium of 8.3.  Urine at 
presentation 2+ ketones, 3+ glucose.

 

Imaging:  Chest x-ray at presentation no focal problems.  EKG at presentation 
sinus tachycardia, rate 114, DE interval 142, QTc 456, QRS axis -13.  Signs of 
an old inferior myocardial infarction, mild intraventricular conduction defect.

 

ASSESSMENT AND PLAN:

1.  New onset diabetes mellitus.  This looks most like type 2 diabetes mellitus 
in view of the fact that he has a strong family history.  He is obese.  He did 
not have marked diabetic ketoacidosis even though there was anion gap.  I will 
rule out out type 1 diabetes, Cushing's syndrome and hemochromatosis.  He has 
an A1c of 18% and hence he has had this for sometime.  He is glucose toxic at 
the moment such that his pancreatic beta cells are unlikely to produce much 
enough insulin and hence he needs initial insulin therapy.  I will also start 
him on metformin treatment right away.  He will need a basal bolus regimen at 
first and I explained this to the patient.  I will adjust this according to his 
body mass and the total amount of insulin he has been receiving thus far.  He 
needs intensive diabetes education which will include having seen such by a 
diabetes educator and a registered dietitian.  I explained that he will need 
basal and bolus insulin at least in the short term and may be in the long term, 
however, in the short term, he needs to have enough skills to be able to self-
manage.  He has had no experience thus far of hypoglycemia.

2.  Coronary artery disease.  He is not taking any medications for this.  He is 
high risk and hence we need to reinitiate management, however, once prioritize 
his diabetes management initially, he will be easily overwhelmed by too much 
information in addition given that he has not taken any of his cardiac 
medications after heart attack and stents.  I am concerned that he may react 
against all medications if I start too many things at once.

3.  Morbid obesity.  He needs to lose weight.  He will do this gradually over 
time and we will make sure that this is sustainable.  We will also consider 
medications that will facilitate this.

4.  Hypertension.  He needs to be restarted on antihypertensives.  He says he 
had coughing with a medication which sounds like an ACE inhibitor.  Again, we 
will reintroduce these medication slowly so that he can accept them.

5.  Dyslipidemia.  He states that he does not have dyslipidemia.  I will check 
this in due course.

6.  Complications of diabetes.  He has likely had this diabetes for some time.  
I will check his urine for microalbumin.  He will require an eye examination.

7.  Numb feet with paresthesia.  This is likely due to electrolyte imbalances. 
This does not precede his acute illness.  I discussed with him this  
possibility though he is aware of the outcome of peripheral neuropathy long 
term if he does not control his diabetes.

8.  He will require close follow up as an outpatient and I will see him while 
he is in the hospital and follow up in my office.

 

 [id number]/CPS #: [id number]

SANAZ

## 2019-12-20 LAB
ANION GAP SERPL CALC-SCNC: 7 MMOL/L (ref 2–11)
BUN SERPL-MCNC: 7 MG/DL (ref 6–24)
BUN/CREAT SERPL: 9.1 (ref 8–20)
CALCIUM SERPL-MCNC: 9.2 MG/DL (ref 8.6–10.3)
CHLORIDE SERPL-SCNC: 102 MMOL/L (ref 101–111)
CHOLEST SERPL-MCNC: 250 MG/DL
FERRITIN SERPL IA-MCNC: 404.9 NG/ML (ref 24–336)
GLUCOSE SERPL-MCNC: 194 MG/DL (ref 70–100)
HCO3 SERPL-SCNC: 27 MMOL/L (ref 22–32)
HDLC SERPL-MCNC: 39.4 MG/DL
IRON SERPL-MCNC: 56 UG/DL (ref 50–212)
POTASSIUM SERPL-SCNC: 3.7 MMOL/L (ref 3.5–5)
RBC UR QL AUTO: (no result)
SODIUM SERPL-SCNC: 136 MMOL/L (ref 135–145)
TIBC SERPL-MCNC: 216 MCG/DL (ref 250–450)
TRANSFERRIN SERPL-MCNC: 154 MG/DL (ref 203–362)
TRIGL SERPL-MCNC: 155 MG/DL
WBC UR QL AUTO: (no result)

## 2019-12-20 RX ADMIN — INSULIN GLARGINE SCH UNIT: 100 INJECTION, SOLUTION SUBCUTANEOUS at 09:45

## 2019-12-20 RX ADMIN — HEPARIN SODIUM SCH UNITS: 5000 INJECTION INTRAVENOUS; SUBCUTANEOUS at 21:53

## 2019-12-20 RX ADMIN — METFORMIN HYDROCHLORIDE SCH MG: 500 TABLET, FILM COATED ORAL at 09:45

## 2019-12-20 RX ADMIN — SULFAMETHOXAZOLE AND TRIMETHOPRIM SCH TAB: 800; 160 TABLET ORAL at 21:54

## 2019-12-20 RX ADMIN — INSULIN LISPRO SCH UNIT: 100 INJECTION, SOLUTION INTRAVENOUS; SUBCUTANEOUS at 09:46

## 2019-12-20 RX ADMIN — INSULIN LISPRO SCH UNIT: 100 INJECTION, SOLUTION INTRAVENOUS; SUBCUTANEOUS at 13:54

## 2019-12-20 RX ADMIN — HEPARIN SODIUM SCH UNITS: 5000 INJECTION INTRAVENOUS; SUBCUTANEOUS at 09:46

## 2019-12-20 RX ADMIN — ASPIRIN SCH MG: 81 TABLET, CHEWABLE ORAL at 09:45

## 2019-12-20 RX ADMIN — METFORMIN HYDROCHLORIDE SCH MG: 500 TABLET, FILM COATED ORAL at 18:20

## 2019-12-20 RX ADMIN — INSULIN LISPRO SCH UNIT: 100 INJECTION, SOLUTION INTRAVENOUS; SUBCUTANEOUS at 21:54

## 2019-12-20 RX ADMIN — INSULIN LISPRO SCH UNIT: 100 INJECTION, SOLUTION INTRAVENOUS; SUBCUTANEOUS at 18:19

## 2019-12-20 RX ADMIN — INSULIN LISPRO SCH UNIT: 100 INJECTION, SOLUTION INTRAVENOUS; SUBCUTANEOUS at 09:45

## 2019-12-20 RX ADMIN — SULFAMETHOXAZOLE AND TRIMETHOPRIM SCH TAB: 800; 160 TABLET ORAL at 09:45

## 2019-12-20 RX ADMIN — INSULIN LISPRO SCH UNIT: 100 INJECTION, SOLUTION INTRAVENOUS; SUBCUTANEOUS at 13:55

## 2019-12-20 NOTE — PN
Subjective





- Subjective


Reason for Note: Progress Note


History: 





He has developed dysuria and has a positive urinalysis.  Otherwise, he is 

feeling much less fatigued.  He continues to have a tachycardia and feel tired 

on exertion.  His glycemic control is improving.  


Active Problems: 


 Active Problems





New onset type 2 diabetes mellitus (Acute) E11.9


Paresthesia of both feet (Acute) R20.2


Sinus tachycardia (Acute) R00.0


Urinary tract infection (Acute) 


Essential hypertension (Chronic) I10


History of coronary artery stent placement (Chronic) Z95.5


History of myocardial infarction (Chronic) I25.2


Hypercholesterolemia (Chronic) E78.00


Obesity (BMI 35.0-39.9 without comorbidity) (Chronic) E66.9








Current Medications: 


 Current Medications





Acetaminophen (Tylenol Tab*)  650 mg PO Q4H PRN


   PRN Reason: PAIN - MILD


   Last Admin: 12/20/19 02:06 Dose:  650 mg


Dextrose (Dextrose 50% Vial 50 Ml*)  25 ml IV PUSH .FOR FS < 60 - SS PRN


   PRN Reason: FS < 60


Heparin Sodium (Porcine) (Heparin Vial(*))  5,000 units SUBCUT Q12HR [facility]


   Last Admin: 12/19/19 21:39 Dose:  5,000 units


Insulin Glargine (Lantus(*))  40 units SUBCUT Q24H [facility]


   Last Admin: 12/19/19 09:35 Dose:  40 unit


Insulin Human Lispro (Humalog*)  0 units SUBCUT ACHS [facility]; Protocol


   Last Admin: 12/19/19 21:38 Dose:  6 unit


Insulin Human Lispro (Humalog*)  0 units SUBCUT AC [facility]; Protocol


   Last Admin: 12/19/19 18:22 Dose:  Not Given


Magnesium Hydroxide (Milk Of Magnesia Liq*)  30 ml PO Q6H PRN


   PRN Reason: CONSTIPATION


   Last Admin: 12/19/19 13:52 Dose:  30 ml


Metformin HCl (Glucophage*)  500 mg PO 0800,1700 [facility]


   Last Admin: 12/19/19 18:25 Dose:  500 mg


Phenazopyridine HCl (Pyridium Tab*)  200 mg PO Q8H PRN


   PRN Reason: DYSURIA


   Last Admin: 12/20/19 02:05 Dose:  200 mg











- Review of Systems


Pulmonary: Positive: Cough


   Negative: Sputum, Hemoptysis, Wheezing, Respiratory Distress, Shortness of 

Breath


Cardiology: 


   Negative: Chest Pain, Palpitations, Swelling of Ankles


Gastroenterology: 


   Negative: Abdominal Pain, Nausea, Vomiting, Anorexia, Constipation, Diarrhea

, Blood in Stools


Genital - Urinary: Positive: Dysuria


Home Medications: 


 Home Medications











 Medication  Instructions  Recorded  Confirmed  Type


 


Multivitamins/Minerals TAB* 1 tab PO DAILY 12/16/19 12/16/19 History





[Theragran/minerals TAB*]    











Allergies: 


 Allergies











Allergy/AdvReac Type Severity Reaction Status Date / Time


 


atenolol Allergy  Bleeding Verified 12/16/19 08:59














Objective





- Vital Signs


Vital Signs: 


 Vital Signs











  12/19/19 12/19/19 12/19/19





  09:00 10:00 11:00


 


Temperature   


 


Pulse Rate 114 131 113


 


Respiratory 12 20 22





Rate   


 


Blood Pressure 143/90 130/92 134/90





(mmHg)   


 


O2 Sat by Pulse 93 93 94





Oximetry   














  12/19/19 12/19/19 12/19/19





  12:00 15:11 16:10


 


Temperature 98.7 F 98.0 F 97.9 F


 


Pulse Rate  125 132


 


Respiratory  18 14





Rate   


 


Blood Pressure  137/86 147/96





(mmHg)   


 


O2 Sat by Pulse  96 98





Oximetry   














  12/19/19 12/19/19 12/19/19





  19:29 20:00 23:08


 


Temperature 97.9 F  97.9 F


 


Pulse Rate 104  113


 


Respiratory 16 16 16





Rate   


 


Blood Pressure 139/95  130/88





(mmHg)   


 


O2 Sat by Pulse 97  96





Oximetry   














  12/20/19





  03:24


 


Temperature 97.3 F


 


Pulse Rate 101


 


Respiratory 16





Rate 


 


Blood Pressure 127/85





(mmHg) 


 


O2 Sat by Pulse 95





Oximetry 














- Intake and Output


Intake and Output: 


 Intake & Output











 12/17/19 12/18/19 12/19/19 12/20/19





 11:59 11:59 11:59 11:59


 


Intake Total 2906 1839.3 3315 420


 


Output Total 2575 2400 3545 


 


Balance 331 -560.7 -230 420


 


Weight 267 lb 3.2 oz 275 lb 9.245 oz 274 lb 9 oz 280 lb 4.8 oz


 


Intake:    


 


  IV Fluids 2004 1784 2916 


 


    D5W NS (0.9%)   1451 


 


    D5W NS 40 meq KCL 1004 378  


 


    NS 1000 1406 762 


 


    NS (0.9%) 20 meq KCL   703 


 


  IVPB   55 


 


    Magnesium   55 


 


  Medicated IV 12 55.3 44 


 


    INSULIN 12 55.3 44 


 


  Oral 890  300 420


 


Output:    


 


  Urine 4055 1940 3555 


 


Other:    


 


  # Voids    2





 





ADLs: Meal  Record                                         Start:  12/16/19 11:

15


Freq:   09,13,18                                           Status: Active      

  


Protocol:                                                                      

  


 Created      12/16/19 11:15  System  (Rec: 12/16/19 11:15  System  IMGED-CS01)


 Document     12/16/19 18:00  [id number]  (Rec: 12/16/19 18:57  [id number]  ICU-M33)


 Document     12/17/19 09:28  [id number]  (Rec: 12/17/19 09:29  [id number]  ICU-C15)


 Document     12/17/19 13:00  [id number]  (Rec: 12/17/19 13:01  [id number]  ICU-C15)


 Document     12/17/19 17:51  DIN0129  (Rec: 12/17/19 17:51  [id number]  ICU-C15)


 Document     12/18/19 09:00  [id number]  (Rec: 12/18/19 11:38  [id number]  ICU-C06)


 Document     12/18/19 13:00  QNA2857  (Rec: 12/18/19 15:00  [id number]  ICU-C06)


 Document     12/18/19 18:00  DMG5033  (Rec: 12/18/19 20:43  [id number]  ICU-C16)


 Document     12/19/19 09:00  MLY5225  (Rec: 12/19/19 11:03  [id number]  ICU-C16)


 Document     12/19/19 13:00  [id number]  (Rec: 12/19/19 14:21  [id number]  MED-M04)


 Document     12/19/19 18:00  BVY8759  (Rec: 12/19/19 18:22  [id number]  MED-C11)


Intake and Output                                          Start:  12/16/19 08:

59


Freq:                                                      Status: Active      

  


Protocol:                                                                      

  


 Created      12/16/19 08:59  System  (Rec: 12/16/19 08:59  System  ED-C24)


 Document     12/16/19 11:24  [id number]  (Rec: 12/16/19 11:24  [id number]  ED-C19)


Intake and Output                                          Start:  12/16/19 11:

15


Freq:   06,14,2200                                         Status: Active      

  


Protocol:                                                                      

  


 Created      12/16/19 11:15  System  (Rec: 12/16/19 11:15  System  IMGED-CS01)


 Document     12/16/19 15:00  FPQ4688  (Rec: 12/16/19 16:43  [id number]  ICU-C15)


 Document     12/16/19 16:00  [id number]  (Rec: 12/16/19 16:43  [id number]  ICU-C15)


 Document     12/16/19 17:00  MSJ0542  (Rec: 12/16/19 18:56  WTM3234  ICU-M33)


 Document     12/16/19 18:00  QYF3734  (Rec: 12/16/19 18:56  [id number]  ICU-M33)


 Document     12/16/19 20:26  RKB0828  (Rec: 12/16/19 20:27  QCL8263  ICU-C15)


 Document     12/16/19 23:27  HTY1347  (Rec: 12/16/19 23:28  BTG2243  ICU-C15)


 Document     12/17/19 03:00  GEY3891  (Rec: 12/17/19 04:17  [id number]  ICU-C15)


 Document     12/17/19 07:00  DNA7229  (Rec: 12/17/19 07:46  HRH1705  ICU-C15)


 Document     12/17/19 08:32  LAA2666  (Rec: 12/17/19 08:32  [id number]  ICU-C15)


 Document     12/17/19 11:41  EYR6255  (Rec: 12/17/19 11:42  BFV3036  ICU-C12)


 Document     12/17/19 14:27  YNF6324  (Rec: 12/17/19 14:27  CUT1702  ICU-C15)


 Document     12/17/19 19:00  QAR5382  (Rec: 12/17/19 19:48  PVU0978  ICU-C16)


 Document     12/18/19 00:29  OEU1176  (Rec: 12/18/19 00:29  [id number]  ICU-C16)


 Document     12/18/19 06:48  WRO7891  (Rec: 12/18/19 06:49  FFJ0178  ICU-C16)


 Document     12/18/19 09:00  CFL1729  (Rec: 12/18/19 10:02  BLO0561  ICU-C10)


 Document     12/18/19 11:26  IRX6016  (Rec: 12/18/19 11:26  YRG1192  ICU-C10)


 Document     12/18/19 13:45  WIU3384  (Rec: 12/18/19 15:02  ZKV0007  ICU-C06)


 Document     12/18/19 15:08  FBN0949  (Rec: 12/18/19 15:08  [id number]  ICU-M35)


 Document     12/18/19 22:11  NDT1555  (Rec: 12/18/19 22:11  [id number]  ICU-C16)


 Document     12/19/19 00:00  VTF6487  (Rec: 12/19/19 00:47  [id number]  ICU-C16)


 Document     12/19/19 00:47  ASB1008  (Rec: 12/19/19 00:47  [id number]  ICU-C16)


 Document     12/19/19 04:00  VPU1405  (Rec: 12/19/19 05:34  [id number]  ICU-C16)


 Document     12/19/19 05:34  WQF0271  (Rec: 12/19/19 05:34  SYF2623  ICU-C16)


 Document     12/19/19 09:00  ZYK6713  (Rec: 12/19/19 11:03  [id number]  ICU-C16)


 Document     12/19/19 13:00  MAR9626  (Rec: 12/19/19 14:20  IEL7786  MED-M04)


 Document     12/19/19 22:00  UXJ8217  (Rec: 12/19/19 22:48  XZG2860  MED-C11)


 Document     12/20/19 05:04  [id number]  (Rec: 12/20/19 05:15  VRF1390  MED-C11)











- Physical Exam


General Physical Exam Comment: Warm and well perfused, in no acute distress.  

He is well hydrated.


General: No Cyanosis, No Anemia, No Jaundice, No Clubbing


Endocrine: Yes Central Obesity, No Acromegaly, No Vitiligo, No Flushing, No 

Acanthosis nigricans, No Violaceious striae, No Cushing Syndrome, No Buccal 

pigmenatation, No Lopez Crease Pigmentation


Lungs and Chest: Yes: Chest Expansion Full, Chest Expansion Symetrica, 

Percussion Note Resonant, Vessicular Breath Sounds.  No: Crackles, Wheezes, 

Respiratory Distress, Use of Accessory Muscles


Heart Rate and Rhythm: Tachycardia


Additional Cardiovascular: Yes: Normal Heart Sounds.  No: Heart Murmur, Pedal 

Edema


Abdominal Exam: Yes: Soft, Bowel Sounds Present.  No: Distention, Abdominal Mass

, Hepatomegaly, Abdominal Tenderness, Guarding, Rebound Tenderness





- Neuro


Orientation: A/O x3


Psychiatric: Normal


Speech: Normal





Results





- Results


Lab Results: 


 Laboratory Results - last 24 hr











  12/19/19 12/19/19 12/19/19





  05:32 08:47 12:03


 


Sodium  137  


 


Potassium  3.5  


 


Chloride  103  


 


Carbon Dioxide  23  


 


Anion Gap  11  


 


BUN  6  


 


Creatinine  0.80  


 


Est GFR ( Amer)  118.9  


 


Est GFR (Non-Af Amer)  98.3  


 


BUN/Creatinine Ratio  7.5 L  


 


Glucose  175 H  


 


POC Glucose (mg/dL)   213 H  208 H


 


Calcium  8.3 L  


 


Magnesium  2.1  


 


Iron   


 


TIBC   


 


% Saturation   


 


Unsat Iron Binding   


 


Transferrin   


 


Ferritin   


 


C-Reactive Protein   


 


Triglycerides   


 


Cholesterol   


 


LDL Cholesterol   


 


HDL Cholesterol   


 


TSH  Cancelled  


 


Cortisol   


 


Urine Color   


 


Urine Appearance   


 


Urine pH   


 


Ur Specific Gravity   


 


Urine Protein   


 


Urine Ketones   


 


Urine Blood   


 


Urine Nitrate   


 


Urine Bilirubin   


 


Urine Urobilinogen   


 


Ur Leukocyte Esterase   


 


Urine WBC (Auto)   


 


Urine RBC (Auto)   


 


Urine Bacteria   


 


Urine Glucose   














  12/19/19 12/19/19 12/20/19





  17:09 20:58 01:13


 


Sodium   


 


Potassium   


 


Chloride   


 


Carbon Dioxide   


 


Anion Gap   


 


BUN   


 


Creatinine   


 


Est GFR ( Amer)   


 


Est GFR (Non-Af Amer)   


 


BUN/Creatinine Ratio   


 


Glucose   


 


POC Glucose (mg/dL)  174 H  229 H  181 H


 


Calcium   


 


Magnesium   


 


Iron   


 


TIBC   


 


% Saturation   


 


Unsat Iron Binding   


 


Transferrin   


 


Ferritin   


 


C-Reactive Protein   


 


Triglycerides   


 


Cholesterol   


 


LDL Cholesterol   


 


HDL Cholesterol   


 


TSH   


 


Cortisol   


 


Urine Color   


 


Urine Appearance   


 


Urine pH   


 


Ur Specific Gravity   


 


Urine Protein   


 


Urine Ketones   


 


Urine Blood   


 


Urine Nitrate   


 


Urine Bilirubin   


 


Urine Urobilinogen   


 


Ur Leukocyte Esterase   


 


Urine WBC (Auto)   


 


Urine RBC (Auto)   


 


Urine Bacteria   


 


Urine Glucose   














  12/20/19 12/20/19 12/20/19





  02:00 06:40 06:40


 


Sodium    136


 


Potassium    3.7


 


Chloride    102


 


Carbon Dioxide    27


 


Anion Gap    7


 


BUN    7


 


Creatinine    0.77


 


Est GFR ( Amer)    124.3


 


Est GFR (Non-Af Amer)    102.7


 


BUN/Creatinine Ratio    9.1


 


Glucose    194 H


 


POC Glucose (mg/dL)   


 


Calcium    9.2


 


Magnesium   


 


Iron    56


 


TIBC    216 L


 


% Saturation    26


 


Unsat Iron Binding    < 201


 


Transferrin    154 L


 


Ferritin    404.9 H


 


C-Reactive Protein    55.75 H


 


Triglycerides    155


 


Cholesterol    250


 


LDL Cholesterol    180


 


HDL Cholesterol    39.4


 


TSH   1.50 


 


Cortisol    4.83


 


Urine Color  Avelina  


 


Urine Appearance  Turbid  


 


Urine pH  7.0  


 


Ur Specific Gravity  1.024  


 


Urine Protein  2+(100 mg/dl) A  


 


Urine Ketones  Trace A  


 


Urine Blood  1+ A  


 


Urine Nitrate  Negative  


 


Urine Bilirubin  Negative  


 


Urine Urobilinogen  Negative  


 


Ur Leukocyte Esterase  3+ A  


 


Urine WBC (Auto)  3+(>20/hpf) A  


 


Urine RBC (Auto)  3+(>10/hpf) A  


 


Urine Bacteria  Absent  


 


Urine Glucose  1+(50 mg/dl) A  














  12/20/19





  07:47


 


Sodium 


 


Potassium 


 


Chloride 


 


Carbon Dioxide 


 


Anion Gap 


 


BUN 


 


Creatinine 


 


Est GFR ( Amer) 


 


Est GFR (Non-Af Amer) 


 


BUN/Creatinine Ratio 


 


Glucose 


 


POC Glucose (mg/dL)  191 H


 


Calcium 


 


Magnesium 


 


Iron 


 


TIBC 


 


% Saturation 


 


Unsat Iron Binding 


 


Transferrin 


 


Ferritin 


 


C-Reactive Protein 


 


Triglycerides 


 


Cholesterol 


 


LDL Cholesterol 


 


HDL Cholesterol 


 


TSH 


 


Cortisol 


 


Urine Color 


 


Urine Appearance 


 


Urine pH 


 


Ur Specific Gravity 


 


Urine Protein 


 


Urine Ketones 


 


Urine Blood 


 


Urine Nitrate 


 


Urine Bilirubin 


 


Urine Urobilinogen 


 


Ur Leukocyte Esterase 


 


Urine WBC (Auto) 


 


Urine RBC (Auto) 


 


Urine Bacteria 


 


Urine Glucose 














Assessment





- Problem List


Assessment: 


Patient Problems





New onset type 2 diabetes mellitus (Acute)


Paresthesia of both feet (Acute)


Sinus tachycardia (Acute)


Urinary tract infection (Acute)


Essential hypertension (Chronic)


History of coronary artery stent placement (Chronic)


History of myocardial infarction (Chronic)


Hypercholesterolemia (Chronic)


Obesity (BMI 35.0-39.9 without comorbidity) (Chronic)








Plan: 





New onset type 2 diabetes mellitus (Acute)  We have ruled out Cushing syndrome. 

His ferritin is elevated - this may be an acute phase response - I will repeat 

this to rule out hemochromatosis in a few weeks.  He is responding well to 

insulin therapy.  He has had some diabetes education and has been reading and 

thinking about his diet.  I provided him with further education:


* Pathophysiology T2D


* Basal/bolus insulin therapy


* Hypoglycemia - symptoms and treatment (measure glucose, 15/15 rule)


* Importance of exercise - walking 30 mins per day (he is deconditioned)


* Discussion about a consistent carb diet.  Information about avoiding 

concentrated carbohydrates.  Portion control of carbohydrates in diet


* Insulin pens and self-monitoring of blood glucose


* I will obtain a monitor and insulin pens from the hospital pharmacy for 

education


He is engaged and asks good questions.


Paresthesia of both feet (Acute)  Improved


Sinus tachycardia (Acute)  He has this at baseline and it goes  up to 140 on 

minor exertion.  This may be a vestige of his volume contracted state and may 

autoregulate.  However, I am going to start him on a beta blocker as an 

outpatient. Given a previous bad response to atenolol, I want to do this when 

he is not starting many other medications


Urinary tract infection (Acute)  I will prescribe bactrim DS


Essential hypertension (Chronic)  His BP is on target.  


History of coronary artery stent placement (Chronic)  I am starting him on 

aspirin 81 mg qdaily


History of myocardial infarction (Chronic)  I am going to restart aspirin, 

statin right now. In due course to start an ARB and a beta blocker.  I will 

refer him to cardiology as an outpatient


Hypercholesterolemia (Chronic)  I am starting him on atorvastatin 40 mg qhs  


Obesity (BMI 35.0-39.9 without comorbidity) (Chronic)  He has a weight loss  

goal of 14 lbs in 7 months (5% body mass). 








I discussed the above with the patient and his significant other Jesusita Pepper.

## 2019-12-21 VITALS — SYSTOLIC BLOOD PRESSURE: 129 MMHG | DIASTOLIC BLOOD PRESSURE: 82 MMHG

## 2019-12-21 LAB
ANION GAP SERPL CALC-SCNC: 8 MMOL/L (ref 2–11)
BUN SERPL-MCNC: 5 MG/DL (ref 6–24)
BUN/CREAT SERPL: 6 (ref 8–20)
CALCIUM SERPL-MCNC: 9.4 MG/DL (ref 8.6–10.3)
CHLORIDE SERPL-SCNC: 99 MMOL/L (ref 101–111)
GLUCOSE SERPL-MCNC: 181 MG/DL (ref 70–100)
HCO3 SERPL-SCNC: 28 MMOL/L (ref 22–32)
POTASSIUM SERPL-SCNC: 4 MMOL/L (ref 3.5–5)
SODIUM SERPL-SCNC: 135 MMOL/L (ref 135–145)

## 2019-12-21 RX ADMIN — INSULIN LISPRO SCH UNIT: 100 INJECTION, SOLUTION INTRAVENOUS; SUBCUTANEOUS at 09:11

## 2019-12-21 RX ADMIN — ASPIRIN SCH MG: 81 TABLET, CHEWABLE ORAL at 09:12

## 2019-12-21 RX ADMIN — INSULIN LISPRO SCH UNIT: 100 INJECTION, SOLUTION INTRAVENOUS; SUBCUTANEOUS at 09:09

## 2019-12-21 RX ADMIN — HEPARIN SODIUM SCH UNITS: 5000 INJECTION INTRAVENOUS; SUBCUTANEOUS at 09:12

## 2019-12-21 RX ADMIN — METFORMIN HYDROCHLORIDE SCH MG: 500 TABLET, FILM COATED ORAL at 09:12

## 2019-12-21 RX ADMIN — INSULIN GLARGINE SCH UNIT: 100 INJECTION, SOLUTION SUBCUTANEOUS at 09:11

## 2019-12-21 RX ADMIN — SULFAMETHOXAZOLE AND TRIMETHOPRIM SCH TAB: 800; 160 TABLET ORAL at 09:12

## 2019-12-21 NOTE — PN
Subjective





- Subjective


Reason for Note: Discharge Note


History: 





He is better and ready for discharge - see discharge summary


Active Problems: 


 Active Problems





New onset type 2 diabetes mellitus (Acute) E11.9


Paresthesia of both feet (Acute) R20.2


Sinus tachycardia (Acute) R00.0


Urinary tract infection (Acute) 


Essential hypertension (Chronic) I10


History of coronary artery stent placement (Chronic) Z95.5


History of myocardial infarction (Chronic) I25.2


Hypercholesterolemia (Chronic) E78.00


Obesity (BMI 35.0-39.9 without comorbidity) (Chronic) E66.9








Current Medications: 


 Current Medications





Acetaminophen (Tylenol Tab*)  650 mg PO Q4H PRN


   PRN Reason: PAIN - MILD


   Last Admin: 12/20/19 02:06 Dose:  650 mg


Aspirin (Aspirin 81 Mg Chew Tab*)  81 mg PO DAILY [facility]


   Last Admin: 12/21/19 09:12 Dose:  81 mg


Atorvastatin Calcium (Lipitor*)  40 mg PO 2100 [facility]


   Last Admin: 12/20/19 21:53 Dose:  40 mg


Dextrose (Dextrose 50% Vial 50 Ml*)  25 ml IV PUSH .FOR FS < 60 - SS PRN


   PRN Reason: FS < 60


Heparin Sodium (Porcine) (Heparin Vial(*))  5,000 units SUBCUT Q12HR [facility]


   Last Admin: 12/21/19 09:12 Dose:  5,000 units


Insulin Glargine (Lantus(*))  40 units SUBCUT Q24H [facility]


   Last Admin: 12/21/19 09:11 Dose:  40 unit


Insulin Human Lispro (Humalog*)  0 units SUBCUT ACHS [facility]; Protocol


   Last Admin: 12/21/19 09:09 Dose:  2 unit


Insulin Human Lispro (Humalog*)  0 units SUBCUT AC [facility]; Protocol


   Last Admin: 12/21/19 09:11 Dose:  3 unit


Magnesium Hydroxide (Milk Of Magnesia Liq*)  30 ml PO Q6H PRN


   PRN Reason: CONSTIPATION


   Last Admin: 12/19/19 13:52 Dose:  30 ml


Metformin HCl (Glucophage*)  500 mg PO 0800,1700 [facility]


   Last Admin: 12/21/19 09:12 Dose:  500 mg


Phenazopyridine HCl (Pyridium Tab*)  200 mg PO Q8H PRN


   PRN Reason: DYSURIA


   Last Admin: 12/20/19 02:05 Dose:  200 mg


Trimethoprim/Sulfamethoxazole (Bactrim Ds 800/160 Tab*)  1 tab PO BID [facility]


   Last Admin: 12/21/19 09:12 Dose:  1 tab








Home Medications: 


 Home Medications











 Medication  Instructions  Recorded  Confirmed  Type


 


Multivitamins/Minerals TAB* 1 tab PO DAILY 12/16/19 12/16/19 History





[Theragran/minerals TAB*]    











Allergies: 


 Allergies











Allergy/AdvReac Type Severity Reaction Status Date / Time


 


atenolol Allergy  Bleeding Verified 12/16/19 08:59














Objective





- Vital Signs


Vital Signs: 


 Vital Signs











  12/20/19 12/20/19 12/20/19





  11:48 15:12 20:00


 


Temperature 97.4 F 98.1 F 


 


Pulse Rate 104 101 


 


Respiratory 22 19 20





Rate   


 


Blood Pressure 143/88 134/80 





(mmHg)   


 


O2 Sat by Pulse 94 96 





Oximetry   














  12/20/19 12/21/19 12/21/19





  20:51 00:22 03:58


 


Temperature 97.8 F 98.3 F 98.7 F


 


Pulse Rate 103 100 107


 


Respiratory 20 22 24





Rate   


 


Blood Pressure 129/88 129/90 106/64





(mmHg)   


 


O2 Sat by Pulse 98 96 98





Oximetry   














  12/21/19





  07:15


 


Temperature 98.3 F


 


Pulse Rate 97


 


Respiratory 22





Rate 


 


Blood Pressure 129/82





(mmHg) 


 


O2 Sat by Pulse 99





Oximetry 














- Intake and Output


Intake and Output: 


 Intake & Output











 12/18/19 12/19/19 12/20/19 12/21/19





 11:59 11:59 11:59 11:59


 


Intake Total 1839.3 3315 660 1560


 


Output Total 2400 3545  0


 


Balance -560.7 -


 


Weight 275 lb 9.245 oz 274 lb 9 oz 280 lb 4.8 oz 280 lb 4.8 oz


 


Intake:    


 


  IV Fluids 1784 2916  


 


    D5W NS (0.9%)  1451  


 


    D5W NS 40 meq    


 


    NS 1406 762  


 


    NS (0.9%) 20 meq KCL  703  


 


  IVPB  55  


 


    Magnesium  55  


 


  Medicated IV 55.3 44  


 


    INSULIN 55.3 44  


 


  Oral  


 


Output:    


 


  Urine 2400 3545  0


 


Other:    


 


  Date of Last Bowel    12/19/19





  Movement    


 


  # Bowel Movements    0


 


  # Voids   2 0





 





ADLs: Meal  Record                                         Start:  12/16/19 11:

15


Freq:   09,13,18                                           Status: Active      

  


Protocol:                                                                      

  


 Created      12/16/19 11:15  System  (Rec: 12/16/19 11:15  System  IMGED-CS01)


 Document     12/16/19 18:00  [id number]  (Rec: 12/16/19 18:57  KWQ2635  ICU-M33)


 Document     12/17/19 09:28  XMM5760  (Rec: 12/17/19 09:29  [id number]  ICU-C15)


 Document     12/17/19 13:00  [id number]  (Rec: 12/17/19 13:01  OUL1737  ICU-C15)


 Document     12/17/19 17:51  LZH3337  (Rec: 12/17/19 17:51  WGQ2693  ICU-C15)


 Document     12/18/19 09:00  WUA4370  (Rec: 12/18/19 11:38  EYI8899  ICU-C06)


 Document     12/18/19 13:00  DIV9107  (Rec: 12/18/19 15:00  TRO7817  ICU-C06)


 Document     12/18/19 18:00  UUX4564  (Rec: 12/18/19 20:43  NFS8210  ICU-C16)


 Document     12/19/19 09:00  YLM0872  (Rec: 12/19/19 11:03  BFB9568  ICU-C16)


 Document     12/19/19 13:00  ZPY5760  (Rec: 12/19/19 14:21  ZVG2561  MED-M04)


 Document     12/19/19 18:00  BNL6482  (Rec: 12/19/19 18:22  [id number]  MED-C11)


 Document     12/20/19 09:00  VWB5307  (Rec: 12/20/19 10:29  BSE1390  MED-C11)


 Document     12/20/19 13:00  KFM2053  (Rec: 12/20/19 13:24  [id number]  MED-C11)


 Document     12/20/19 18:00  HWP4410  (Rec: 12/20/19 18:51  VHM5626  MED-C11)


 Document     12/21/19 09:00  TBY7523  (Rec: 12/21/19 09:03  TVI1162  MED-C11)


Intake and Output                                          Start:  12/16/19 08:

59


Freq:                                                      Status: Active      

  


Protocol:                                                                      

  


 Created      12/16/19 08:59  System  (Rec: 12/16/19 08:59  System  ED-C24)


 Document     12/16/19 11:24  EBE9457  (Rec: 12/16/19 11:24  UKG1590  ED-C19)


Intake and Output                                          Start:  12/16/19 11:

15


Freq:   06,14,2200                                         Status: Active      

  


Protocol:                                                                      

  


 Created      12/16/19 11:15  System  (Rec: 12/16/19 11:15  System  IMGED-CS01)


 Document     12/16/19 15:00  XVE1864  (Rec: 12/16/19 16:43  FPX4316  ICU-C15)


 Document     12/16/19 16:00  YJN3520  (Rec: 12/16/19 16:43  MLZ6528  ICU-C15)


 Document     12/16/19 17:00  IMT2246  (Rec: 12/16/19 18:56  RKR9866  ICU-M33)


 Document     12/16/19 18:00  ATB6804  (Rec: 12/16/19 18:56  HIP9220  ICU-M33)


 Document     12/16/19 20:26  XOU4836  (Rec: 12/16/19 20:27  ZYE2829  ICU-C15)


 Document     12/16/19 23:27  NOK5946  (Rec: 12/16/19 23:28  WNG7918  ICU-C15)


 Document     12/17/19 03:00  GQZ5525  (Rec: 12/17/19 04:17  NZV6788  ICU-C15)


 Document     12/17/19 07:00  YOS3960  (Rec: 12/17/19 07:46  SZQ2024  ICU-C15)


 Document     12/17/19 08:32  DXU1305  (Rec: 12/17/19 08:32  OZV7028  ICU-C15)


 Document     12/17/19 11:41  MEB8604  (Rec: 12/17/19 11:42  DFH9277  ICU-C12)


 Document     12/17/19 14:27  EMU9819  (Rec: 12/17/19 14:27  QVB1966  ICU-C15)


 Document     12/17/19 19:00  SXG6818  (Rec: 12/17/19 19:48  MPD8012  ICU-C16)


 Document     12/18/19 00:29  VFC2908  (Rec: 12/18/19 00:29  WVH0257  ICU-C16)


 Document     12/18/19 06:48  HFY7781  (Rec: 12/18/19 06:49  XMW3767  ICU-C16)


 Document     12/18/19 09:00  WNQ6380  (Rec: 12/18/19 10:02  CIU2553  ICU-C10)


 Document     12/18/19 11:26  BIV1550  (Rec: 12/18/19 11:26  CBF3830  ICU-C10)


 Document     12/18/19 13:45  EZQ5489  (Rec: 12/18/19 15:02  KBR7611  ICU-C06)


 Document     12/18/19 15:08  TKM1037  (Rec: 12/18/19 15:08  UKX6620  ICU-M35)


 Document     12/18/19 22:11  DKI1491  (Rec: 12/18/19 22:11  DXQ0329  ICU-C16)


 Document     12/19/19 00:00  MTL8632  (Rec: 12/19/19 00:47  TCU6413  ICU-C16)


 Document     12/19/19 00:47  XAZ7552  (Rec: 12/19/19 00:47  IJL5141  ICU-C16)


 Document     12/19/19 04:00  BDB7227  (Rec: 12/19/19 05:34  EDC3902  ICU-C16)


 Document     12/19/19 05:34  WTR5467  (Rec: 12/19/19 05:34  YRD0856  ICU-C16)


 Document     12/19/19 09:00  HFS6943  (Rec: 12/19/19 11:03  EQZ3703  ICU-C16)


 Document     12/19/19 13:00  VIW1846  (Rec: 12/19/19 14:20  DKV5707  MED-M04)


 Document     12/19/19 22:00  WZQ8063  (Rec: 12/19/19 22:48  BMH2407  MED-C11)


 Document     12/20/19 05:04  IYI9813  (Rec: 12/20/19 05:15  QHZ5005  MED-C11)


 Document     12/20/19 13:31  LJV6268  (Rec: 12/20/19 13:32  OEC5004  MED-C11)


 Document     12/20/19 21:26  GMO5192  (Rec: 12/20/19 21:26  THX2995  MEDL-C01)


 Document     12/21/19 06:00  CJT1671  (Rec: 12/21/19 07:22  BTH4628  MEDL-C02)











- Physical Exam


General: No Cyanosis, No Anemia, No Jaundice, No Clubbing


Lungs and Chest: Yes: Chest Expansion Full, Chest Expansion Symetrica, 

Percussion Note Resonant, Vessicular Breath Sounds.  No: Crackles, Wheezes


Heart Rate and Rhythm: Regular


Additional Cardiovascular: Yes: Normal Heart Sounds.  No: Heart Murmur, Pedal 

Edema


Abdominal Exam: Yes: Soft, Bowel Sounds Present.  No: Distention, Abdominal 

Tenderness





Results





- Results


Lab Results: 


 Laboratory Results - last 24 hr











  12/20/19 12/20/19 12/20/19





  12:23 17:17 20:58


 


Sodium   


 


Potassium   


 


Chloride   


 


Carbon Dioxide   


 


Anion Gap   


 


BUN   


 


Creatinine   


 


Est GFR ( Amer)   


 


Est GFR (Non-Af Amer)   


 


BUN/Creatinine Ratio   


 


Glucose   


 


POC Glucose (mg/dL)  236 H  176 H  159 H


 


Calcium   














  12/21/19 12/21/19





  07:49 08:09


 


Sodium  135 


 


Potassium  4.0 


 


Chloride  99 L 


 


Carbon Dioxide  28 


 


Anion Gap  8 


 


BUN  5 L 


 


Creatinine  0.84 


 


Est GFR ( Amer)  112.4 


 


Est GFR (Non-Af Amer)  92.9 


 


BUN/Creatinine Ratio  6.0 L 


 


Glucose  181 H 


 


POC Glucose (mg/dL)   132 H


 


Calcium  9.4 














Assessment





- Problem List


Assessment: 


Patient Problems





New onset type 2 diabetes mellitus (Acute)


Paresthesia of both feet (Acute)


Sinus tachycardia (Acute)


Urinary tract infection (Acute)


Essential hypertension (Chronic)


History of coronary artery stent placement (Chronic)


History of myocardial infarction (Chronic)


Hypercholesterolemia (Chronic)


Obesity (BMI 35.0-39.9 without comorbidity) (Chronic)








Plan: 





He is recovered and adjusting well to monitoring and self-injection.  I will 

discharge him home.





Condition improved





disposition home

## 2019-12-21 NOTE — DS
DISCHARGE SUMMARY:

 

DATE OF ADMISSION:  12/16/19

 

DATE OF DISCHARGE:  12/21/19

 

DISCHARGE DIAGNOSES:

1.  New onset type 2 diabetes with hyperglycemia and mild acidosis.

2.  Urinary tract infection.

 

COMORBIDITIES:  Paresthesia, both feet; electrolyte abnormalities; diabetes education and initiation 
of insulin therapy.

 

SECONDARY DIAGNOSES:

1.  Obesity.

2.  Hypercholesterolemia.

3.  History of myocardial infarction.

4.  History of coronary artery stent placement.

5.  Noncompliance.

 

CONDITION AT DISCHARGE:  Fair.

 

DISPOSITION:  Home.

 

HISTORY:  Rosendo Souza is a 59-year-old male.  His presentation is documented in Dr. Stephan Mcdonald's 
admitting history and physical.  In short, he has had a history of 2 weeks of lethargy, polyuria, eloy
ydipsia, altered vision.  He presented to the emergency room with glucose of 410, an anion gap of 24,
 a bicarbonate of 20.  His EKG showed no signs of ischemia.

 

PHYSICAL EXAMINATION:  Temperature 97.2, heart rate 102, respirations 18, oxygen saturation 98% on ro
om air, blood pressure 146/105.  No focal findings.

 

DIAGNOSTIC STUDIES/LAB DATA:  Initial chemistry:  Sodium 135, bicarbonate 20, anion gap 24, BUN 15, c
reatinine 1.38, glucose 410.  CRP 44.2.  Lactate 1.5.  EKG:  Sinus tachycardia with no acute ST or T-
wave changes.

 

INITIAL IMPRESSION:  New onset diabetes with hyperglycemia, probable ketosis.

 

He was managed initially on the ICU with IV insulin infusion, IV hydration, and electrolyte replaceme
nt.

 

CONSULTATIONS:  I saw him in consultation on 12/19/19 and assumed position as his attending physician
 and primary care physician as he did not have a primary care physician.  Given that he had a strong 
family history, I felt that this was most likely type 2 diabetes.  He did not have marked diabetic ke
toacidosis.  I started him on a basal bolus regimen with Lantus insulin 40 units and lispro insulin b
y carbohydrate counting.  I initiated intensive diabetes education, took account of his coronary aston
ry disease, dyslipidemia, and his hypertension.

 

INVESTIGATIONS:  His electrolytes were normalized.  His glucose came under control. TSH was normal at
 1.5.  Cortisol was not elevated at 4.83, ruling out Cushing syndrome.  C-reactive protein was 44.  H
is ferritin was elevated likely as an acute phase reaction of 405, this will be followed up as an out
patient to ensure he does not have hemochromatosis causing his diabetes.  Pending C-peptide and GAD65
 antibodies.  His hemoglobin A1C was 18%.  Urinalysis at presentation, 2+ ketones, 3+ glucose; on 12/
20/19, 2+ protein, 1+ blood, 3+ leukocyte esterase.  Urine culture was negative.

 

IMAGING:  Chest x-ray showed no acute disease.

 

HOSPITAL COURSE:  His initial stay in the ICU was under the management of Dr. Stephan Mcdonald, who correc
ori his hyperglycemia and his electrolyte abnormalities.  I assumed care and placed him on the basal 
bolus regimen, initiated metformin therapy, started him on a statin and an aspirin for his coronary a
rtery disease.  I avoided starting him on an ACE inhibitor and a beta-blocker as he had problems with
 drugs like this in the past, and given his history of noncompliance, I felt that it was wei to wait
 until he was an outpatient and stable before initiating this therapy.  He was a quick student, learn
t the basics about management of type 2 diabetes mellitus by insulin therapy.  I explained to him getachew
t he is glucose toxic and that in due course, we may be able to wean him off insulin and place him on
 oral agents or GLP-1 agonist.

 

On the day of discharge, he is feeling much better, he is no longer feeling short of breath when he w
alks around or lightheaded.  He has no polyuria, polydipsia. His dysuria from the urinary tract infec
tion he developed during the hospitalization has gone with Bactrim.  He feels ready to go home.

 

PHYSICAL EXAMINATION ON THE DAY OF DISCHARGE:  Temperature 98.3, heart rate 93, respirations 22, oxyg
en saturation 99% on room air, blood pressure 129/82.  No cyanosis, anemia, jaundice, clubbing, or ly
mphadenopathy.  Warm and well perfused. Well hydrated.  Cardiovascular System:  Pulse regular, normal
 in character and volume.  Venous pressure not elevated.  [place]aus beat not displaced.  Heart sounds norm
al, no added sounds.  No murmurs.  No pedal edema.  Respiratory system:  Chest expansion full and sym
metrical.  Percussion note resonant.  Breath sounds vesicular.  No crackles or wheezes.  Abdominal ex
amination:  No masses, tenderness, or organomegaly.

 

ASSESSMENT AND PLAN:

1.  New onset type 2 diabetes mellitus.  I have placed him on a basal bolus insulin regimen.  He has 
learnt self-monitoring.  He has also learnt how to use insulin pen devices.  He has received educatio
n from Jenise Del Castillo, RN, CDE.  He has a transition of care visit already organized at my office.  He is 
joining my medical practice as a primary care patient and also as a diabetes patient.  I have discuss
ed hypoglycemia and told him about the 15-15 rule.  There are still some pending results for his C-pe
ptide and YESICA antibodies.

2.  Obesity.  He has got initial target of losing 10 pounds in 5 months, which is 5% of his body weig
ht.

3.  Essential hypertension.  I will start to treat this as an outpatient.

4.  Hypercholesterolemia.  He is already started on atorvastatin.

5.  History of coronary artery disease and prior coronary artery stents.  He will need as an outpatie
nt to start a beta-blocker and an ARB as he has had a cough with medications in the past, which proba
marie are ACE inhibitors.  I will also need to have him see a cardiologist for ongoing care.  He does n
ot appear to have had any problems during the hospitalization aside from the tachycardia.

6.  Poor compliance.  He is at this time sounding like he has had transformation in this area and is 
likely to be very attentive to his future management.

 

DISCHARGE MEDICATIONS:

1.  Lovastatin 40 mg q.h.s.

2.  Aspirin 81 mg daily.

3.  Metformin 1000 mg twice daily.

4.  Insulin glargine 40 units subcutaneously every 24 hours.

5.  Lispro insulin by carbohydrate counting; small meals, 4 units; medium meals, 6 units; large meals
, 8 units; plus 2 units for every 50 mg/dL above 150 mg/dL.

6.  Multivitamin 1 a day.

 

He will follow up for transition of care visit at my office.

 

 [id number]/[facility] #: [id number]

## 2019-12-26 LAB — GAD65 AB SER-SCNC: 0 NMOL/L (ref ?–0.02)
